# Patient Record
Sex: MALE | Race: WHITE | NOT HISPANIC OR LATINO | Employment: FULL TIME | URBAN - NONMETROPOLITAN AREA
[De-identification: names, ages, dates, MRNs, and addresses within clinical notes are randomized per-mention and may not be internally consistent; named-entity substitution may affect disease eponyms.]

---

## 2017-06-21 ENCOUNTER — TELEPHONE (OUTPATIENT)
Dept: SURGERY | Facility: CLINIC | Age: 52
End: 2017-06-21

## 2017-06-28 ENCOUNTER — TELEPHONE (OUTPATIENT)
Dept: SURGERY | Facility: CLINIC | Age: 52
End: 2017-06-28

## 2017-07-07 ENCOUNTER — TELEPHONE (OUTPATIENT)
Dept: SURGERY | Facility: CLINIC | Age: 52
End: 2017-07-07

## 2017-07-07 NOTE — TELEPHONE ENCOUNTER
I called pt again to attempt to schedule a screening colonoscopy, I left a message on his voice mail asking him to return my call, I also called Funmilayo Lima's office to inform her that I have not been able to contact pt, I will also mail him letter asking him to call the office to schedule.

## 2018-09-07 ENCOUNTER — TELEPHONE (OUTPATIENT)
Dept: SURGERY | Facility: CLINIC | Age: 53
End: 2018-09-07

## 2018-09-13 NOTE — TELEPHONE ENCOUNTER
Pt said he had a colonoscopy 3-5 yrs ago at .  THey are unable to find any record of him.  Nothing at [facility] either.

## 2018-09-14 ENCOUNTER — OFFICE VISIT (OUTPATIENT)
Dept: SURGERY | Facility: CLINIC | Age: 53
End: 2018-09-14

## 2018-09-14 VITALS
HEIGHT: 66 IN | DIASTOLIC BLOOD PRESSURE: 84 MMHG | SYSTOLIC BLOOD PRESSURE: 132 MMHG | WEIGHT: 218.8 LBS | TEMPERATURE: 96.9 F | BODY MASS INDEX: 35.17 KG/M2 | HEART RATE: 69 BPM | OXYGEN SATURATION: 98 %

## 2018-09-14 DIAGNOSIS — Z12.11 SCREENING FOR COLON CANCER: Primary | ICD-10-CM

## 2018-09-14 PROCEDURE — S0260 H&P FOR SURGERY: HCPCS | Performed by: SURGERY

## 2018-09-14 RX ORDER — BENAZEPRIL HYDROCHLORIDE 40 MG/1
40 TABLET, FILM COATED ORAL DAILY
Refills: 3 | COMMUNITY
Start: 2018-09-10

## 2018-09-14 RX ORDER — POLYETHYLENE GLYCOL 3350 17 G/17G
238 POWDER, FOR SOLUTION ORAL ONCE
Qty: 14 PACKET | Refills: 0 | Status: SHIPPED | OUTPATIENT
Start: 2018-09-14 | End: 2018-09-14

## 2018-09-14 RX ORDER — BISACODYL 5 MG/1
5 TABLET, DELAYED RELEASE ORAL DAILY
Qty: 4 TABLET | Refills: 0 | Status: SHIPPED | OUTPATIENT
Start: 2018-09-14

## 2018-09-14 NOTE — PROGRESS NOTES
Patient: Michael Paz    YOB: 1965    Date: 09/14/2018    Primary Care Provider: Jenniffer Lima APRN    Chief Complaint   Patient presents with   • Diarrhea       Subjective .     History of present illness:  Patient here for screen colonoscopy.  Had a colonoscopy 12 years ago.  No complaints except for occasional diarrhea.    The following portions of the patient's history were reviewed and updated as appropriate: allergies, current medications, past family history, past medical history, past social history, past surgical history and problem list.      Review of Systems   Constitutional: Negative for chills, fever and unexpected weight change.   HENT: Negative for trouble swallowing and voice change.    Eyes: Negative for visual disturbance.   Respiratory: Positive for chest tightness. Negative for apnea, cough, shortness of breath and wheezing.    Cardiovascular: Negative for chest pain, palpitations and leg swelling.   Gastrointestinal: Positive for diarrhea. Negative for abdominal distention, abdominal pain, anal bleeding, blood in stool, constipation, nausea, rectal pain and vomiting.   Endocrine: Negative for cold intolerance and heat intolerance.   Genitourinary: Negative for difficulty urinating, dysuria, flank pain, scrotal swelling and testicular pain.   Musculoskeletal: Negative for back pain, gait problem and joint swelling.   Skin: Negative for color change, rash and wound.   Neurological: Negative for dizziness, syncope, speech difficulty, weakness, numbness and headaches.   Hematological: Negative for adenopathy. Does not bruise/bleed easily.   Psychiatric/Behavioral: Negative for confusion. The patient is not nervous/anxious.        History:  Past Medical History:   Diagnosis Date   • Acid reflux    • Asthma    • Depression    • Diabetes mellitus (CMS/HCC)    • Heart murmur    • Hypertension    • Polyp of colon 2007       Past Surgical History:   Procedure Laterality Date   •  "COLONOSCOPY  2007    Dr Jef Hays [facility]       Family History   Problem Relation Age of Onset   • Heart disease Father    • Hyperlipidemia Father    • Hypertension Father    • Kidney disease Brother    • Stroke Maternal Grandmother        Social History   Substance Use Topics   • Smoking status: Never Smoker   • Smokeless tobacco: Never Used   • Alcohol use No       Medications:   Current Outpatient Prescriptions:   •  aspirin 81 MG EC tablet, Take 81 mg by mouth daily., Disp: , Rfl:   •  atorvastatin (LIPITOR) 40 MG tablet, , Disp: , Rfl:   •  benazepril (LOTENSIN) 40 MG tablet, Take 40 mg by mouth Daily. 200001, Disp: , Rfl: 3  •  fexofenadine (ALLEGRA) 180 MG tablet, Take 180 mg by mouth daily., Disp: , Rfl:   •  hydrochlorothiazide (HYDRODIURIL) 25 MG tablet, , Disp: , Rfl:   •  metFORMIN (GLUCOPHAGE) 500 MG tablet, , Disp: , Rfl:   •  omeprazole (PriLOSEC) 40 MG capsule, , Disp: , Rfl:   •  sertraline (ZOLOFT) 100 MG tablet, , Disp: , Rfl:   •  VENTOLIN  (90 BASE) MCG/ACT inhaler, , Disp: , Rfl:        Allergies:   Allergies   Allergen Reactions   • Penicillins        Objective     Vital Signs:  Vitals:    09/14/18 0853   BP: 132/84   Pulse: 69   Temp: 96.9 °F (36.1 °C)   SpO2: 98%   Weight: 99.2 kg (218 lb 12.8 oz)   Height: 167.6 cm (66\")       Physical Exam:   General Appearance:    Alert, cooperative, in no acute distress   Head:    Normocephalic, without obvious abnormality, atraumatic   Eyes:            Lids and lashes normal, conjunctivae and sclerae normal, no   icterus, no pallor, corneas clear, PERRL   Ears:    Ears appear intact with no abnormalities noted   Lungs:     Clear to auscultation,respirations regular, even and                  unlabored    Heart:    Regular rhythm and normal rate, normal S1 and S2, no            murmur   Abdomen:     no masses, no organomegaly, soft non-tender, non-distended, no guarding   Extremities:   Moves all extremities well, no " edema, no cyanosis, no             redness   Skin:   No bleeding, bruising or rash   Neurologic:   Cranial nerves 2 - 12 grossly intact, sensation intact  Psychiatric: No evidence of depression or anxiety   Results Review:   None    Review of Systems was reviewed and confirmed as accurate today.    Assessment/Plan :    1. Screening for colon cancer        I recommend a colonoscopy for further evaluation. The procedure was explained as well as the risks which include but are not limited to bleeding, infection, perforation, abdominal pain etc. The patient understands these risks and the procedure and wishes to proceed.      Electronically signed by Pepe Alfaro MD  09/14/18  8:56 AM    Portions of this note have been scribed for Pepe Alfaro MD by Margarita Grant CMA 9/14/2018  9:20 AM

## 2018-09-17 ENCOUNTER — PREP FOR SURGERY (OUTPATIENT)
Dept: OTHER | Facility: HOSPITAL | Age: 53
End: 2018-09-17

## 2018-09-17 DIAGNOSIS — Z12.11 COLON CANCER SCREENING: Primary | ICD-10-CM

## 2018-09-18 PROBLEM — Z12.11 COLON CANCER SCREENING: Status: ACTIVE | Noted: 2018-09-18

## 2018-10-17 ENCOUNTER — TELEPHONE (OUTPATIENT)
Dept: SURGERY | Facility: CLINIC | Age: 53
End: 2018-10-17

## 2019-01-21 ENCOUNTER — TELEPHONE (OUTPATIENT)
Dept: SURGERY | Facility: CLINIC | Age: 54
End: 2019-01-21

## 2019-02-26 ENCOUNTER — TELEPHONE (OUTPATIENT)
Dept: SURGERY | Facility: CLINIC | Age: 54
End: 2019-02-26

## 2019-03-18 ENCOUNTER — TELEPHONE (OUTPATIENT)
Dept: SURGERY | Facility: CLINIC | Age: 54
End: 2019-03-18

## 2019-03-19 ENCOUNTER — ANESTHESIA EVENT (OUTPATIENT)
Dept: GASTROENTEROLOGY | Facility: HOSPITAL | Age: 54
End: 2019-03-19

## 2019-03-19 ENCOUNTER — PREP FOR SURGERY (OUTPATIENT)
Dept: OTHER | Facility: HOSPITAL | Age: 54
End: 2019-03-19

## 2019-03-19 ENCOUNTER — HOSPITAL ENCOUNTER (OUTPATIENT)
Facility: HOSPITAL | Age: 54
Setting detail: HOSPITAL OUTPATIENT SURGERY
Discharge: HOME | End: 2019-03-19
Attending: SURGERY | Admitting: SURGERY

## 2019-03-19 ENCOUNTER — ANESTHESIA (OUTPATIENT)
Dept: GASTROENTEROLOGY | Facility: HOSPITAL | Age: 54
End: 2019-03-19

## 2019-03-19 VITALS
DIASTOLIC BLOOD PRESSURE: 72 MMHG | RESPIRATION RATE: 18 BRPM | HEART RATE: 58 BPM | SYSTOLIC BLOOD PRESSURE: 119 MMHG | WEIGHT: 198 LBS | BODY MASS INDEX: 31.82 KG/M2 | OXYGEN SATURATION: 99 % | HEIGHT: 66 IN | TEMPERATURE: 97.3 F

## 2019-03-19 PROCEDURE — S0260 H&P FOR SURGERY: HCPCS | Performed by: SURGERY

## 2019-03-19 PROCEDURE — 25010000002 PROPOFOL 10 MG/ML EMULSION: Performed by: NURSE ANESTHETIST, CERTIFIED REGISTERED

## 2019-03-19 PROCEDURE — 25010000002 MIDAZOLAM PER 1 MG: Performed by: NURSE ANESTHETIST, CERTIFIED REGISTERED

## 2019-03-19 RX ORDER — MAGNESIUM HYDROXIDE 1200 MG/15ML
LIQUID ORAL AS NEEDED
Status: DISCONTINUED | OUTPATIENT
Start: 2019-03-19 | End: 2019-03-19 | Stop reason: HOSPADM

## 2019-03-19 RX ORDER — MEPERIDINE HYDROCHLORIDE 50 MG/ML
INJECTION INTRAMUSCULAR; INTRAVENOUS; SUBCUTANEOUS AS NEEDED
Status: DISCONTINUED | OUTPATIENT
Start: 2019-03-19 | End: 2019-03-19 | Stop reason: SURG

## 2019-03-19 RX ORDER — SODIUM CHLORIDE, SODIUM LACTATE, POTASSIUM CHLORIDE, CALCIUM CHLORIDE 600; 310; 30; 20 MG/100ML; MG/100ML; MG/100ML; MG/100ML
INJECTION, SOLUTION INTRAVENOUS CONTINUOUS PRN
Status: DISCONTINUED | OUTPATIENT
Start: 2019-03-19 | End: 2019-03-19 | Stop reason: SURG

## 2019-03-19 RX ORDER — SODIUM CHLORIDE, SODIUM LACTATE, POTASSIUM CHLORIDE, CALCIUM CHLORIDE 600; 310; 30; 20 MG/100ML; MG/100ML; MG/100ML; MG/100ML
9 INJECTION, SOLUTION INTRAVENOUS CONTINUOUS PRN
Status: DISCONTINUED | OUTPATIENT
Start: 2019-03-19 | End: 2019-03-19 | Stop reason: HOSPADM

## 2019-03-19 RX ORDER — ONDANSETRON 2 MG/ML
4 INJECTION INTRAMUSCULAR; INTRAVENOUS ONCE AS NEEDED
Status: DISCONTINUED | OUTPATIENT
Start: 2019-03-19 | End: 2019-03-19 | Stop reason: HOSPADM

## 2019-03-19 RX ORDER — PROPOFOL 10 MG/ML
VIAL (ML) INTRAVENOUS AS NEEDED
Status: DISCONTINUED | OUTPATIENT
Start: 2019-03-19 | End: 2019-03-19 | Stop reason: SURG

## 2019-03-19 RX ORDER — SODIUM CHLORIDE 0.9 % (FLUSH) 0.9 %
3-10 SYRINGE (ML) INJECTION AS NEEDED
Status: DISCONTINUED | OUTPATIENT
Start: 2019-03-19 | End: 2019-03-19 | Stop reason: HOSPADM

## 2019-03-19 RX ORDER — KETAMINE HCL IN NACL, ISO-OSM 100MG/10ML
SYRINGE (ML) INJECTION AS NEEDED
Status: DISCONTINUED | OUTPATIENT
Start: 2019-03-19 | End: 2019-03-19 | Stop reason: SURG

## 2019-03-19 RX ORDER — SODIUM CHLORIDE 0.9 % (FLUSH) 0.9 %
3 SYRINGE (ML) INJECTION EVERY 12 HOURS SCHEDULED
Status: DISCONTINUED | OUTPATIENT
Start: 2019-03-19 | End: 2019-03-19 | Stop reason: HOSPADM

## 2019-03-19 RX ORDER — MIDAZOLAM HYDROCHLORIDE 1 MG/ML
INJECTION INTRAMUSCULAR; INTRAVENOUS AS NEEDED
Status: DISCONTINUED | OUTPATIENT
Start: 2019-03-19 | End: 2019-03-19 | Stop reason: SURG

## 2019-03-19 RX ADMIN — MEPERIDINE HYDROCHLORIDE 25 MG: 50 INJECTION, SOLUTION INTRAMUSCULAR; INTRAVENOUS; SUBCUTANEOUS at 12:31

## 2019-03-19 RX ADMIN — MIDAZOLAM HYDROCHLORIDE 2 MG: 1 INJECTION, SOLUTION INTRAMUSCULAR; INTRAVENOUS at 12:30

## 2019-03-19 RX ADMIN — PROPOFOL 50 MG: 10 INJECTION, EMULSION INTRAVENOUS at 12:31

## 2019-03-19 RX ADMIN — SODIUM CHLORIDE, POTASSIUM CHLORIDE, SODIUM LACTATE AND CALCIUM CHLORIDE 9 ML/HR: 600; 310; 30; 20 INJECTION, SOLUTION INTRAVENOUS at 11:29

## 2019-03-19 RX ADMIN — PROPOFOL 20 MG: 10 INJECTION, EMULSION INTRAVENOUS at 12:37

## 2019-03-19 RX ADMIN — MEPERIDINE HYDROCHLORIDE 25 MG: 50 INJECTION, SOLUTION INTRAMUSCULAR; INTRAVENOUS; SUBCUTANEOUS at 12:34

## 2019-03-19 RX ADMIN — PROPOFOL 20 MG: 10 INJECTION, EMULSION INTRAVENOUS at 12:34

## 2019-03-19 RX ADMIN — PROPOFOL 20 MG: 10 INJECTION, EMULSION INTRAVENOUS at 12:45

## 2019-03-19 RX ADMIN — Medication 25 MG: at 12:33

## 2019-03-19 RX ADMIN — PROPOFOL 20 MG: 10 INJECTION, EMULSION INTRAVENOUS at 12:40

## 2019-03-19 RX ADMIN — SODIUM CHLORIDE, POTASSIUM CHLORIDE, SODIUM LACTATE AND CALCIUM CHLORIDE: 600; 310; 30; 20 INJECTION, SOLUTION INTRAVENOUS at 12:05

## 2019-03-19 RX ADMIN — PROPOFOL 20 MG: 10 INJECTION, EMULSION INTRAVENOUS at 12:43

## 2019-03-19 NOTE — H&P
[facility]   HISTORY AND PHYSICAL      Name:  Michael Paz   Age:  53 y.o.  Sex:  male  :  1965  MRN:  [id number]   Visit Number:  [id number]  Admission Date:  3/19/2019  Date Of Service:  19  Primary Care Physician:  Jenniffer Lima APRN    Chief Complaint:     I need a colonoscopy    History Of Presenting Illness:      Patient is here for his planned colonoscopy.  He has no complaints.    Review Of Systems:     General ROS: Patient denies any fevers, chills or loss of consciousness.  No complaints of generalized weakness  Psychological ROS: Denies any hallucinations and delusions.  Respiratory ROS: Denies cough or shortness of breath.   Cardiovascular ROS: Denies chest pain or palpitations. No history of exertional chest pain.   Gastrointestinal ROS: Denies nausea and vomiting. Denies any abdominal pain. No diarrhea.   Genito-Urinary ROS: Denies dysuria or hematuria.  Neurological ROS: Denies any focal weakness. No loss of consciousness. Denies any numbness.   Dermatological ROS: Denies any redness or pruritis.     Past Medical History:    Past Medical History:   Diagnosis Date   • Acid reflux    • Asthma    • Depression    • Diabetes mellitus (CMS/[facility])    • Heart murmur    • Hyperlipidemia    • Hypertension    • Polyp of colon    • Wears glasses        Past Surgical history:    Past Surgical History:   Procedure Laterality Date   • COLONOSCOPY      Dr Jef Hays [facility]   • MULTIPLE TOOTH EXTRACTIONS         Social History:    Social History     Socioeconomic History   • Marital status:      Spouse name: Not on file   • Number of children: Not on file   • Years of education: Not on file   • Highest education level: Not on file   Social Needs   • Financial resource strain: Not on file   • Food insecurity - worry: Not on file   • Food insecurity - inability: Not on file   • Transportation needs - medical: Not on file   •  Transportation needs - non-medical: Not on file   Occupational History   • Not on file   Tobacco Use   • Smoking status: Never Smoker   • Smokeless tobacco: Former User   Substance and Sexual Activity   • Alcohol use: No   • Drug use: No   • Sexual activity: Defer   Other Topics Concern   • Not on file   Social History Narrative   • Not on file       Family History:    Family History   Problem Relation Age of Onset   • Heart disease Father    • Hyperlipidemia Father    • Hypertension Father    • Kidney disease Brother    • Stroke Maternal Grandmother        Allergies:      Penicillins    Home Medications:    Prior to Admission Medications     Prescriptions Last Dose Informant Patient Reported? Taking?    aspirin 81 MG EC tablet Past Week Self Yes Yes    Take 81 mg by mouth daily.    atorvastatin (LIPITOR) 40 MG tablet 3/18/2019 Self Yes Yes    Take 40 mg by mouth Daily.    benazepril (LOTENSIN) 40 MG tablet 3/18/2019 Self Yes Yes    Take 40 mg by mouth Daily. 200001    bisacodyl (bisacodyl) 5 MG EC tablet 3/18/2019  No Yes    Take 1 tablet by mouth Daily.    fexofenadine (ALLEGRA) 180 MG tablet 3/18/2019 Self Yes Yes    Take 180 mg by mouth daily.    hydrochlorothiazide (HYDRODIURIL) 25 MG tablet 3/18/2019 Self Yes Yes    Take 25 mg by mouth Daily.    metFORMIN (GLUCOPHAGE) 500 MG tablet 3/18/2019 Self Yes Yes    Take 500 mg by mouth Daily With Breakfast.    omeprazole (PriLOSEC) 40 MG capsule 3/18/2019 Self Yes Yes    Take 40 mg by mouth Daily.    sertraline (ZOLOFT) 100 MG tablet 3/18/2019 Self Yes Yes    Take 150 mg by mouth Daily.    VENTOLIN  (90 BASE) MCG/ACT inhaler Past Week Self Yes Yes    Inhale 2 puffs Every 4 (Four) Hours As Needed for Wheezing.             ED Medications:    Medications   sodium chloride 0.9 % flush 3 mL (not administered)   sodium chloride 0.9 % flush 3-10 mL (not administered)   lactated ringers infusion (9 mL/hr Intravenous New Bag 3/19/19 1123)   polyethylene glycol 3350  powder (bulk) (not administered)   sterile water irrigation solution (500 mL Irrigation Given 3/19/19 1228)       Vital Signs:    Temp:  [98 °F (36.7 °C)] 98 °F (36.7 °C)  Heart Rate:  [71] 71  BP: (135)/(93) 135/93        03/18/19  1129   Weight: 89.8 kg (198 lb)       Body mass index is 31.96 kg/m².    Physical Exam:      General Appearance:  Alert and cooperative, not in any acute distress.   Head:  Atraumatic and normocephalic, without obvious abnormality.   Eyes:          PERRLA, conjunctivae and sclerae normal, no Icterus. No pallor. Extra-occular movements are within normal limits.   Ears:  Ears appear intact with no abnormalities noted.   Respiratory/Lungs:   Breath sounds heard bilaterally equally.  No crackles or wheezing. No Pleural rub or bronchial breathing. Normal respiratory effort.    Cardiovascular/Heart:  Normal S1 and S2,    GI/Abdomen:   No masses, no hepatosplenomegaly. Soft, non-tender, non-distended, no hernia                 Musculoskeletal/ Extremities:   Moves all extremities well   Skin: No bleeding, bruising or rash, no induration   Psychiatric : Alert and oriented ×3.  No depression or anxiety    Neurologic: Cranial nerves 2 - 12 grossly intact, sensation intact, Motor power is normal and equal bilaterally.       EKG:          Labs:    Lab Results (last 24 hours)     ** No results found for the last 24 hours. **          Radiology:    Imaging Results (last 72 hours)     ** No results found for the last 72 hours. **          Assessment:    Screening colonoscopy     Plan:     I recommend a screening colonoscopy to the patient.  The patient understands the procedure and the reason for the procedure.  The patient also understands the risks which include but are not limited to bleeding and perforation and they understand the ramifications of these potential complications and wish to proceed.    Pepe Alfaro MD  03/19/19  12:30 PM

## 2019-03-19 NOTE — DISCHARGE INSTRUCTIONS
Please follow all post op instructions and follow up appointment time from your physician's office included in your discharge packet.    No pushing, pulling, tugging,  heavy lifting, or strenuous activity.  No major decision making, driving, or drinking alcoholic beverages for 24 hours. ( due to the medications you have  received)  Always use good hand hygiene/washing techniques.  NO driving while taking pain medications.    To assist you in voiding:  Drink plenty of fluids  Listen to running water while attempting to void.    If you are unable to urinate and you have an uncomfortable urge to void or it has been   6 hours since you were discharged, return to the Emergency Room

## 2019-03-19 NOTE — ANESTHESIA POSTPROCEDURE EVALUATION
Patient: Michael Paz    Procedure Summary     Date:  03/19/19 Room / Location:  [facility] ENDOSCOPY 3 / [facility] ENDOSCOPY    Anesthesia Start:  1229 Anesthesia Stop:      Procedure:  COLONOSCOPY (N/A Anus) Diagnosis:      Surgeon:  Pepe Alfaro MD Provider:  Ángel Hurley CRNA    Anesthesia Type:  MAC ASA Status:  3          Anesthesia Type: MAC  Last vitals  BP   135/93 (03/19/19 1115)   Temp   98 °F (36.7 °C) (03/19/19 1115)   Pulse   71 (03/19/19 1115)   Resp         SpO2   97 % (03/19/19 1115)     Post Anesthesia Care and Evaluation    Patient location during evaluation: PHASE II  Patient participation: complete - patient participated  Level of consciousness: awake  Pain score: 0  Pain management: adequate  Airway patency: patent  Anesthetic complications: No anesthetic complications  PONV Status: none  Cardiovascular status: acceptable  Respiratory status: acceptable and nasal cannula  Hydration status: acceptable    Comments: vsss resp spont, reflexes intact, responsive, report given to pacu nurse

## 2019-03-19 NOTE — ANESTHESIA PREPROCEDURE EVALUATION
Anesthesia Evaluation     Patient summary reviewed and Nursing notes reviewed   no history of anesthetic complications:  NPO Solid Status: > 8 hours  NPO Liquid Status: > 8 hours           Airway   Mallampati: II  TM distance: >3 FB  Neck ROM: full  No difficulty expected and Possible difficult intubation  Dental      Pulmonary    (+) a smoker Current, COPD, asthma, shortness of breath, decreased breath sounds,   Cardiovascular     (+) hypertension, valvular problems/murmurs, AGUILERA, PVD, hyperlipidemia,   CAD:  inc risk obese, aiden, dm.      Neuro/Psych  (+) psychiatric history Depression and Anxiety,     GI/Hepatic/Renal/Endo    (+) obesity,  GERD,  diabetes mellitus,     Musculoskeletal     (+) arthralgias, myalgias,   Abdominal   (+) obese,    Substance History   (+) drug use     OB/GYN          Other                      Anesthesia Plan    ASA 3     MAC   (Risks and benefits discussed including risk of aspiration, recall and dental damage. All patient questions answered. Will continue with POC.)  intravenous induction   Anesthetic plan, all risks, benefits, and alternatives have been provided, discussed and informed consent has been obtained with: patient.

## 2019-06-21 ENCOUNTER — HOSPITAL ENCOUNTER (EMERGENCY)
Facility: HOSPITAL | Age: 54
Discharge: HOME | End: 2019-06-21
Attending: EMERGENCY MEDICINE | Admitting: EMERGENCY MEDICINE

## 2019-06-21 VITALS
HEIGHT: 66 IN | HEART RATE: 67 BPM | OXYGEN SATURATION: 99 % | WEIGHT: 200 LBS | TEMPERATURE: 97.8 F | RESPIRATION RATE: 16 BRPM | SYSTOLIC BLOOD PRESSURE: 119 MMHG | DIASTOLIC BLOOD PRESSURE: 71 MMHG | BODY MASS INDEX: 32.14 KG/M2

## 2019-06-21 DIAGNOSIS — E87.6 HYPOKALEMIA: Primary | ICD-10-CM

## 2019-06-21 LAB
ANION GAP SERPL CALCULATED.3IONS-SCNC: 13.7 MMOL/L (ref 10–20)
BUN BLD-MCNC: 17 MG/DL (ref 7–20)
BUN/CREAT SERPL: 17 (ref 6.3–21.9)
CALCIUM SPEC-SCNC: 9.1 MG/DL (ref 8.4–10.2)
CHLORIDE SERPL-SCNC: 101 MMOL/L (ref 98–107)
CO2 SERPL-SCNC: 30 MMOL/L (ref 26–30)
CREAT BLD-MCNC: 1 MG/DL (ref 0.6–1.3)
GFR SERPL CREATININE-BSD FRML MDRD: 78 ML/MIN/1.73
GLUCOSE BLD-MCNC: 134 MG/DL (ref 74–98)
POTASSIUM BLD-SCNC: 2.7 MMOL/L (ref 3.5–5.1)
SODIUM BLD-SCNC: 142 MMOL/L (ref 137–145)

## 2019-06-21 PROCEDURE — 80048 BASIC METABOLIC PNL TOTAL CA: CPT | Performed by: PHYSICIAN ASSISTANT

## 2019-06-21 PROCEDURE — 99283 EMERGENCY DEPT VISIT LOW MDM: CPT

## 2019-06-21 PROCEDURE — 25010000003 POTASSIUM CHLORIDE 10 MEQ/100ML SOLUTION: Performed by: PHYSICIAN ASSISTANT

## 2019-06-21 PROCEDURE — 96365 THER/PROPH/DIAG IV INF INIT: CPT

## 2019-06-21 RX ORDER — POTASSIUM CHLORIDE 750 MG/1
40 CAPSULE, EXTENDED RELEASE ORAL ONCE
Status: COMPLETED | OUTPATIENT
Start: 2019-06-21 | End: 2019-06-21

## 2019-06-21 RX ORDER — POTASSIUM CHLORIDE 750 MG/1
20 TABLET, FILM COATED, EXTENDED RELEASE ORAL DAILY
Qty: 5 TABLET | Refills: 0 | Status: SHIPPED | OUTPATIENT
Start: 2019-06-21

## 2019-06-21 RX ORDER — ACETAMINOPHEN 500 MG
1000 TABLET ORAL ONCE
Status: COMPLETED | OUTPATIENT
Start: 2019-06-21 | End: 2019-06-21

## 2019-06-21 RX ORDER — POTASSIUM CHLORIDE 7.45 MG/ML
10 INJECTION INTRAVENOUS ONCE
Status: COMPLETED | OUTPATIENT
Start: 2019-06-21 | End: 2019-06-21

## 2019-06-21 RX ADMIN — POTASSIUM CHLORIDE 10 MEQ: 7.46 INJECTION, SOLUTION INTRAVENOUS at 13:15

## 2019-06-21 RX ADMIN — ACETAMINOPHEN 1000 MG: 500 TABLET, FILM COATED ORAL at 14:36

## 2019-06-21 RX ADMIN — POTASSIUM CHLORIDE 40 MEQ: 750 CAPSULE, EXTENDED RELEASE ORAL at 13:15

## 2019-06-25 ENCOUNTER — TRANSCRIBE ORDERS (OUTPATIENT)
Dept: LAB | Facility: HOSPITAL | Age: 54
End: 2019-06-25

## 2019-06-25 ENCOUNTER — LAB (OUTPATIENT)
Dept: LAB | Facility: HOSPITAL | Age: 54
End: 2019-06-25

## 2019-06-25 DIAGNOSIS — E87.6 HYPOPOTASSEMIA: ICD-10-CM

## 2019-06-25 DIAGNOSIS — E87.6 HYPOPOTASSEMIA: Primary | ICD-10-CM

## 2019-06-25 LAB
ALBUMIN SERPL-MCNC: 4.6 G/DL (ref 3.5–5)
ALBUMIN/GLOB SERPL: 1.4 G/DL (ref 1–2)
ALP SERPL-CCNC: 62 U/L (ref 38–126)
ALT SERPL W P-5'-P-CCNC: 41 U/L (ref 13–69)
ANION GAP SERPL CALCULATED.3IONS-SCNC: 13.4 MMOL/L (ref 10–20)
AST SERPL-CCNC: 38 U/L (ref 15–46)
BILIRUB SERPL-MCNC: 0.8 MG/DL (ref 0.2–1.3)
BUN BLD-MCNC: 17 MG/DL (ref 7–20)
BUN/CREAT SERPL: 17 (ref 6.3–21.9)
CALCIUM SPEC-SCNC: 9.2 MG/DL (ref 8.4–10.2)
CHLORIDE SERPL-SCNC: 99 MMOL/L (ref 98–107)
CO2 SERPL-SCNC: 31 MMOL/L (ref 26–30)
CREAT BLD-MCNC: 1 MG/DL (ref 0.6–1.3)
GFR SERPL CREATININE-BSD FRML MDRD: 78 ML/MIN/1.73
GLOBULIN UR ELPH-MCNC: 3.2 GM/DL
GLUCOSE BLD-MCNC: 101 MG/DL (ref 74–98)
POTASSIUM BLD-SCNC: 3.4 MMOL/L (ref 3.5–5.1)
PROT SERPL-MCNC: 7.8 G/DL (ref 6.3–8.2)
SODIUM BLD-SCNC: 140 MMOL/L (ref 137–145)

## 2019-06-25 PROCEDURE — 80053 COMPREHEN METABOLIC PANEL: CPT

## 2019-06-25 PROCEDURE — 36415 COLL VENOUS BLD VENIPUNCTURE: CPT

## 2019-07-02 ENCOUNTER — TRANSCRIBE ORDERS (OUTPATIENT)
Dept: LAB | Facility: HOSPITAL | Age: 54
End: 2019-07-02

## 2019-07-02 ENCOUNTER — LAB (OUTPATIENT)
Dept: LAB | Facility: HOSPITAL | Age: 54
End: 2019-07-02

## 2019-07-02 DIAGNOSIS — E87.6 HYPOPOTASSEMIA: ICD-10-CM

## 2019-07-02 DIAGNOSIS — E87.6 HYPOPOTASSEMIA: Primary | ICD-10-CM

## 2019-07-02 LAB
ALBUMIN SERPL-MCNC: 4.4 G/DL (ref 3.5–5)
ALBUMIN/GLOB SERPL: 1.4 G/DL (ref 1–2)
ALP SERPL-CCNC: 73 U/L (ref 38–126)
ALT SERPL W P-5'-P-CCNC: 32 U/L (ref 13–69)
ANION GAP SERPL CALCULATED.3IONS-SCNC: 10.6 MMOL/L (ref 10–20)
AST SERPL-CCNC: 31 U/L (ref 15–46)
BILIRUB SERPL-MCNC: 0.6 MG/DL (ref 0.2–1.3)
BUN BLD-MCNC: 19 MG/DL (ref 7–20)
BUN/CREAT SERPL: 21.1 (ref 6.3–21.9)
CALCIUM SPEC-SCNC: 8.8 MG/DL (ref 8.4–10.2)
CHLORIDE SERPL-SCNC: 100 MMOL/L (ref 98–107)
CO2 SERPL-SCNC: 31 MMOL/L (ref 26–30)
CREAT BLD-MCNC: 0.9 MG/DL (ref 0.6–1.3)
GFR SERPL CREATININE-BSD FRML MDRD: 88 ML/MIN/1.73
GLOBULIN UR ELPH-MCNC: 3.2 GM/DL
GLUCOSE BLD-MCNC: 104 MG/DL (ref 74–98)
POTASSIUM BLD-SCNC: 3.6 MMOL/L (ref 3.5–5.1)
PROT SERPL-MCNC: 7.6 G/DL (ref 6.3–8.2)
SODIUM BLD-SCNC: 138 MMOL/L (ref 137–145)

## 2019-07-02 PROCEDURE — 36415 COLL VENOUS BLD VENIPUNCTURE: CPT

## 2019-07-02 PROCEDURE — 80053 COMPREHEN METABOLIC PANEL: CPT

## 2019-08-08 ENCOUNTER — HOSPITAL ENCOUNTER (OUTPATIENT)
Dept: GENERAL RADIOLOGY | Facility: HOSPITAL | Age: 54
Discharge: HOME | End: 2019-08-08
Admitting: NURSE PRACTITIONER

## 2019-08-08 ENCOUNTER — TRANSCRIBE ORDERS (OUTPATIENT)
Dept: ADMINISTRATIVE | Facility: HOSPITAL | Age: 54
End: 2019-08-08

## 2019-08-08 DIAGNOSIS — M54.2 NECK PAIN: ICD-10-CM

## 2019-08-08 DIAGNOSIS — M54.5 LOW BACK PAIN, UNSPECIFIED BACK PAIN LATERALITY, UNSPECIFIED CHRONICITY, WITH SCIATICA PRESENCE UNSPECIFIED: Primary | ICD-10-CM

## 2019-08-08 PROCEDURE — 72050 X-RAY EXAM NECK SPINE 4/5VWS: CPT

## 2019-08-08 PROCEDURE — 72110 X-RAY EXAM L-2 SPINE 4/>VWS: CPT

## 2020-01-10 ENCOUNTER — APPOINTMENT (OUTPATIENT)
Dept: GENERAL RADIOLOGY | Facility: HOSPITAL | Age: 55
End: 2020-01-10

## 2020-01-10 ENCOUNTER — TRANSCRIBE ORDERS (OUTPATIENT)
Dept: GENERAL RADIOLOGY | Facility: HOSPITAL | Age: 55
End: 2020-01-10

## 2020-01-10 ENCOUNTER — HOSPITAL ENCOUNTER (OUTPATIENT)
Dept: GENERAL RADIOLOGY | Facility: HOSPITAL | Age: 55
Discharge: HOME | End: 2020-01-10
Admitting: NURSE PRACTITIONER

## 2020-01-10 DIAGNOSIS — R09.89 CHEST CONGESTION: ICD-10-CM

## 2020-01-10 DIAGNOSIS — R05.9 COUGH: ICD-10-CM

## 2020-01-10 DIAGNOSIS — R05.9 COUGH: Primary | ICD-10-CM

## 2020-01-10 PROCEDURE — 71046 X-RAY EXAM CHEST 2 VIEWS: CPT

## 2025-07-21 ENCOUNTER — APPOINTMENT (OUTPATIENT)
Dept: CT IMAGING | Facility: HOSPITAL | Age: 60
End: 2025-07-21
Payer: MEDICARE

## 2025-07-21 ENCOUNTER — HOSPITAL ENCOUNTER (OUTPATIENT)
Facility: HOSPITAL | Age: 60
Setting detail: OBSERVATION
Discharge: HOME | End: 2025-07-23
Attending: EMERGENCY MEDICINE | Admitting: STUDENT IN AN ORGANIZED HEALTH CARE EDUCATION/TRAINING PROGRAM
Payer: MEDICARE

## 2025-07-21 DIAGNOSIS — K80.80 BILIARY CALCULUS OF OTHER SITE WITHOUT OBSTRUCTION: ICD-10-CM

## 2025-07-21 DIAGNOSIS — K80.00 CALCULUS OF GALLBLADDER WITH ACUTE CHOLECYSTITIS WITHOUT OBSTRUCTION: Primary | ICD-10-CM

## 2025-07-21 LAB
ALBUMIN SERPL-MCNC: 4.4 G/DL (ref 3.5–5.2)
ALBUMIN/GLOB SERPL: 1.6 G/DL
ALP SERPL-CCNC: 99 U/L (ref 39–117)
ALT SERPL W P-5'-P-CCNC: 106 U/L (ref 1–41)
ANION GAP SERPL CALCULATED.3IONS-SCNC: 14.5 MMOL/L (ref 5–15)
AST SERPL-CCNC: 177 U/L (ref 1–40)
BACTERIA UR QL AUTO: ABNORMAL /HPF
BASOPHILS # BLD AUTO: 0.05 10*3/MM3 (ref 0–0.2)
BASOPHILS NFR BLD AUTO: 0.5 % (ref 0–1.5)
BILIRUB SERPL-MCNC: 1.3 MG/DL (ref 0–1.2)
BILIRUB UR QL STRIP: NEGATIVE
BUN SERPL-MCNC: 15 MG/DL (ref 6–20)
BUN/CREAT SERPL: 11.9 (ref 7–25)
CALCIUM SPEC-SCNC: 9.2 MG/DL (ref 8.6–10.5)
CHLORIDE SERPL-SCNC: 108 MMOL/L (ref 98–107)
CLARITY UR: CLEAR
CO2 SERPL-SCNC: 18.5 MMOL/L (ref 22–29)
COLOR UR: ABNORMAL
CREAT SERPL-MCNC: 1.26 MG/DL (ref 0.76–1.27)
D-LACTATE SERPL-SCNC: 1.5 MMOL/L (ref 0.5–2)
DEPRECATED RDW RBC AUTO: 39.2 FL (ref 37–54)
EGFRCR SERPLBLD CKD-EPI 2021: 65.7 ML/MIN/1.73
EOSINOPHIL # BLD AUTO: 0.44 10*3/MM3 (ref 0–0.4)
EOSINOPHIL NFR BLD AUTO: 4.2 % (ref 0.3–6.2)
ERYTHROCYTE [DISTWIDTH] IN BLOOD BY AUTOMATED COUNT: 12.2 % (ref 12.3–15.4)
GLOBULIN UR ELPH-MCNC: 2.8 GM/DL
GLUCOSE SERPL-MCNC: 125 MG/DL (ref 65–99)
GLUCOSE UR STRIP-MCNC: NEGATIVE MG/DL
HCT VFR BLD AUTO: 38.2 % (ref 37.5–51)
HGB BLD-MCNC: 13.4 G/DL (ref 13–17.7)
HGB UR QL STRIP.AUTO: ABNORMAL
HOLD SPECIMEN: NORMAL
HOLD SPECIMEN: NORMAL
HYALINE CASTS UR QL AUTO: ABNORMAL /LPF
IMM GRANULOCYTES # BLD AUTO: 0.02 10*3/MM3 (ref 0–0.05)
IMM GRANULOCYTES NFR BLD AUTO: 0.2 % (ref 0–0.5)
KETONES UR QL STRIP: ABNORMAL
LEUKOCYTE ESTERASE UR QL STRIP.AUTO: NEGATIVE
LIPASE SERPL-CCNC: 19 U/L (ref 13–60)
LYMPHOCYTES # BLD AUTO: 1.36 10*3/MM3 (ref 0.7–3.1)
LYMPHOCYTES NFR BLD AUTO: 12.8 % (ref 19.6–45.3)
MCH RBC QN AUTO: 30.4 PG (ref 26.6–33)
MCHC RBC AUTO-ENTMCNC: 35.1 G/DL (ref 31.5–35.7)
MCV RBC AUTO: 86.6 FL (ref 79–97)
MONOCYTES # BLD AUTO: 0.58 10*3/MM3 (ref 0.1–0.9)
MONOCYTES NFR BLD AUTO: 5.5 % (ref 5–12)
NEUTROPHILS NFR BLD AUTO: 76.8 % (ref 42.7–76)
NEUTROPHILS NFR BLD AUTO: 8.15 10*3/MM3 (ref 1.7–7)
NITRITE UR QL STRIP: NEGATIVE
NRBC BLD AUTO-RTO: 0 /100 WBC (ref 0–0.2)
PH UR STRIP.AUTO: 5.5 [PH] (ref 5–8)
PLATELET # BLD AUTO: 247 10*3/MM3 (ref 140–450)
PMV BLD AUTO: 9.4 FL (ref 6–12)
POTASSIUM SERPL-SCNC: 4 MMOL/L (ref 3.5–5.2)
PROT SERPL-MCNC: 7.2 G/DL (ref 6–8.5)
PROT UR QL STRIP: ABNORMAL
RBC # BLD AUTO: 4.41 10*6/MM3 (ref 4.14–5.8)
RBC # UR STRIP: ABNORMAL /HPF
REF LAB TEST METHOD: ABNORMAL
SODIUM SERPL-SCNC: 141 MMOL/L (ref 136–145)
SP GR UR STRIP: 1.02 (ref 1–1.03)
SQUAMOUS #/AREA URNS HPF: ABNORMAL /HPF
UROBILINOGEN UR QL STRIP: ABNORMAL
WBC # UR STRIP: ABNORMAL /HPF
WBC NRBC COR # BLD AUTO: 10.6 10*3/MM3 (ref 3.4–10.8)
WHOLE BLOOD HOLD COAG: NORMAL
WHOLE BLOOD HOLD SPECIMEN: NORMAL

## 2025-07-21 PROCEDURE — 99285 EMERGENCY DEPT VISIT HI MDM: CPT | Performed by: EMERGENCY MEDICINE

## 2025-07-21 PROCEDURE — 80053 COMPREHEN METABOLIC PANEL: CPT | Performed by: EMERGENCY MEDICINE

## 2025-07-21 PROCEDURE — 82077 ASSAY SPEC XCP UR&BREATH IA: CPT | Performed by: FAMILY MEDICINE

## 2025-07-21 PROCEDURE — 83690 ASSAY OF LIPASE: CPT | Performed by: EMERGENCY MEDICINE

## 2025-07-21 PROCEDURE — 83605 ASSAY OF LACTIC ACID: CPT | Performed by: EMERGENCY MEDICINE

## 2025-07-21 PROCEDURE — 74177 CT ABD & PELVIS W/CONTRAST: CPT

## 2025-07-21 PROCEDURE — 85025 COMPLETE CBC W/AUTO DIFF WBC: CPT | Performed by: EMERGENCY MEDICINE

## 2025-07-21 PROCEDURE — 81001 URINALYSIS AUTO W/SCOPE: CPT | Performed by: EMERGENCY MEDICINE

## 2025-07-21 RX ORDER — SODIUM CHLORIDE 0.9 % (FLUSH) 0.9 %
10 SYRINGE (ML) INJECTION AS NEEDED
Status: DISCONTINUED | OUTPATIENT
Start: 2025-07-21 | End: 2025-07-23 | Stop reason: HOSPADM

## 2025-07-22 ENCOUNTER — ANESTHESIA (OUTPATIENT)
Dept: PERIOP | Facility: HOSPITAL | Age: 60
End: 2025-07-22
Payer: MEDICARE

## 2025-07-22 ENCOUNTER — ANESTHESIA EVENT (OUTPATIENT)
Dept: PERIOP | Facility: HOSPITAL | Age: 60
End: 2025-07-22
Payer: MEDICARE

## 2025-07-22 ENCOUNTER — APPOINTMENT (OUTPATIENT)
Dept: GENERAL RADIOLOGY | Facility: HOSPITAL | Age: 60
End: 2025-07-22
Payer: MEDICARE

## 2025-07-22 ENCOUNTER — APPOINTMENT (OUTPATIENT)
Dept: ULTRASOUND IMAGING | Facility: HOSPITAL | Age: 60
End: 2025-07-22
Payer: MEDICARE

## 2025-07-22 PROBLEM — K80.20 CHOLELITHIASIS: Status: ACTIVE | Noted: 2025-07-22

## 2025-07-22 LAB
ALBUMIN SERPL-MCNC: 4.3 G/DL (ref 3.5–5.2)
ALBUMIN/GLOB SERPL: 1.8 G/DL
ALP SERPL-CCNC: 109 U/L (ref 39–117)
ALT SERPL W P-5'-P-CCNC: 174 U/L (ref 1–41)
ANION GAP SERPL CALCULATED.3IONS-SCNC: 12.3 MMOL/L (ref 5–15)
AST SERPL-CCNC: 190 U/L (ref 1–40)
BASOPHILS # BLD AUTO: 0.04 10*3/MM3 (ref 0–0.2)
BASOPHILS NFR BLD AUTO: 0.4 % (ref 0–1.5)
BILIRUB SERPL-MCNC: 0.6 MG/DL (ref 0–1.2)
BUN SERPL-MCNC: 15 MG/DL (ref 6–20)
BUN/CREAT SERPL: 11.2 (ref 7–25)
CALCIUM SPEC-SCNC: 8.9 MG/DL (ref 8.6–10.5)
CHLORIDE SERPL-SCNC: 107 MMOL/L (ref 98–107)
CO2 SERPL-SCNC: 22.7 MMOL/L (ref 22–29)
CREAT SERPL-MCNC: 1.34 MG/DL (ref 0.76–1.27)
DEPRECATED RDW RBC AUTO: 39.6 FL (ref 37–54)
EGFRCR SERPLBLD CKD-EPI 2021: 61 ML/MIN/1.73
EOSINOPHIL # BLD AUTO: 0.19 10*3/MM3 (ref 0–0.4)
EOSINOPHIL NFR BLD AUTO: 1.7 % (ref 0.3–6.2)
ERYTHROCYTE [DISTWIDTH] IN BLOOD BY AUTOMATED COUNT: 12.3 % (ref 12.3–15.4)
ETHANOL BLD-MCNC: <10 MG/DL (ref 0–10)
ETHANOL UR QL: <0.01 %
GLOBULIN UR ELPH-MCNC: 2.4 GM/DL
GLUCOSE SERPL-MCNC: 121 MG/DL (ref 65–99)
HCT VFR BLD AUTO: 36.6 % (ref 37.5–51)
HGB BLD-MCNC: 12.8 G/DL (ref 13–17.7)
IMM GRANULOCYTES # BLD AUTO: 0.06 10*3/MM3 (ref 0–0.05)
IMM GRANULOCYTES NFR BLD AUTO: 0.5 % (ref 0–0.5)
LYMPHOCYTES # BLD AUTO: 1.39 10*3/MM3 (ref 0.7–3.1)
LYMPHOCYTES NFR BLD AUTO: 12.4 % (ref 19.6–45.3)
MCH RBC QN AUTO: 30.5 PG (ref 26.6–33)
MCHC RBC AUTO-ENTMCNC: 35 G/DL (ref 31.5–35.7)
MCV RBC AUTO: 87.1 FL (ref 79–97)
MONOCYTES # BLD AUTO: 0.77 10*3/MM3 (ref 0.1–0.9)
MONOCYTES NFR BLD AUTO: 6.8 % (ref 5–12)
NEUTROPHILS NFR BLD AUTO: 78.2 % (ref 42.7–76)
NEUTROPHILS NFR BLD AUTO: 8.8 10*3/MM3 (ref 1.7–7)
NRBC BLD AUTO-RTO: 0 /100 WBC (ref 0–0.2)
PLATELET # BLD AUTO: 225 10*3/MM3 (ref 140–450)
PMV BLD AUTO: 9.7 FL (ref 6–12)
POTASSIUM SERPL-SCNC: 3.8 MMOL/L (ref 3.5–5.2)
PROT SERPL-MCNC: 6.7 G/DL (ref 6–8.5)
RBC # BLD AUTO: 4.2 10*6/MM3 (ref 4.14–5.8)
SODIUM SERPL-SCNC: 142 MMOL/L (ref 136–145)
WBC NRBC COR # BLD AUTO: 11.25 10*3/MM3 (ref 3.4–10.8)

## 2025-07-22 PROCEDURE — 25010000002 MIDAZOLAM PER 1MG: Performed by: NURSE ANESTHETIST, CERTIFIED REGISTERED

## 2025-07-22 PROCEDURE — 80053 COMPREHEN METABOLIC PANEL: CPT | Performed by: FAMILY MEDICINE

## 2025-07-22 PROCEDURE — 25010000002 ONDANSETRON PER 1 MG: Performed by: FAMILY MEDICINE

## 2025-07-22 PROCEDURE — 25010000002 DEXAMETHASONE PER 1 MG: Performed by: NURSE ANESTHETIST, CERTIFIED REGISTERED

## 2025-07-22 PROCEDURE — 25810000003 LACTATED RINGERS PER 1000 ML: Performed by: STUDENT IN AN ORGANIZED HEALTH CARE EDUCATION/TRAINING PROGRAM

## 2025-07-22 PROCEDURE — 25010000002 MORPHINE PER 10 MG: Performed by: FAMILY MEDICINE

## 2025-07-22 PROCEDURE — 25010000002 CEFTRIAXONE PER 250 MG: Performed by: STUDENT IN AN ORGANIZED HEALTH CARE EDUCATION/TRAINING PROGRAM

## 2025-07-22 PROCEDURE — 96375 TX/PRO/DX INJ NEW DRUG ADDON: CPT

## 2025-07-22 PROCEDURE — 25810000003 LACTATED RINGERS PER 1000 ML: Performed by: FAMILY MEDICINE

## 2025-07-22 PROCEDURE — 25010000002 LIDOCAINE (CARDIAC): Performed by: NURSE ANESTHETIST, CERTIFIED REGISTERED

## 2025-07-22 PROCEDURE — 96361 HYDRATE IV INFUSION ADD-ON: CPT

## 2025-07-22 PROCEDURE — 96374 THER/PROPH/DIAG INJ IV PUSH: CPT

## 2025-07-22 PROCEDURE — 25010000002 MORPHINE PER 10 MG: Performed by: STUDENT IN AN ORGANIZED HEALTH CARE EDUCATION/TRAINING PROGRAM

## 2025-07-22 PROCEDURE — 25810000003 LACTATED RINGERS PER 1000 ML: Performed by: NURSE ANESTHETIST, CERTIFIED REGISTERED

## 2025-07-22 PROCEDURE — G0378 HOSPITAL OBSERVATION PER HR: HCPCS

## 2025-07-22 PROCEDURE — 99222 1ST HOSP IP/OBS MODERATE 55: CPT | Performed by: FAMILY MEDICINE

## 2025-07-22 PROCEDURE — 25510000001 IOPAMIDOL 61 % SOLUTION: Performed by: EMERGENCY MEDICINE

## 2025-07-22 PROCEDURE — 25010000002 ONDANSETRON PER 1 MG: Performed by: NURSE ANESTHETIST, CERTIFIED REGISTERED

## 2025-07-22 PROCEDURE — 25010000002 HYDROMORPHONE PER 4 MG: Performed by: NURSE ANESTHETIST, CERTIFIED REGISTERED

## 2025-07-22 PROCEDURE — 25010000002 LIDOCAINE 1 % SOLUTION: Performed by: STUDENT IN AN ORGANIZED HEALTH CARE EDUCATION/TRAINING PROGRAM

## 2025-07-22 PROCEDURE — 25010000002 INDOCYANINE GREEN 25 MG RECONSTITUTED SOLUTION: Performed by: STUDENT IN AN ORGANIZED HEALTH CARE EDUCATION/TRAINING PROGRAM

## 2025-07-22 PROCEDURE — 88304 TISSUE EXAM BY PATHOLOGIST: CPT

## 2025-07-22 PROCEDURE — 25010000002 METRONIDAZOLE 500 MG/100ML SOLUTION: Performed by: FAMILY MEDICINE

## 2025-07-22 PROCEDURE — 25010000002 PROCHLORPERAZINE 10 MG/2ML SOLUTION: Performed by: STUDENT IN AN ORGANIZED HEALTH CARE EDUCATION/TRAINING PROGRAM

## 2025-07-22 PROCEDURE — 25010000002 CEFAZOLIN PER 500 MG: Performed by: STUDENT IN AN ORGANIZED HEALTH CARE EDUCATION/TRAINING PROGRAM

## 2025-07-22 PROCEDURE — 96376 TX/PRO/DX INJ SAME DRUG ADON: CPT

## 2025-07-22 PROCEDURE — 99204 OFFICE O/P NEW MOD 45 MIN: CPT | Performed by: STUDENT IN AN ORGANIZED HEALTH CARE EDUCATION/TRAINING PROGRAM

## 2025-07-22 PROCEDURE — 76705 ECHO EXAM OF ABDOMEN: CPT

## 2025-07-22 PROCEDURE — 25010000002 PROPOFOL 10 MG/ML EMULSION: Performed by: NURSE ANESTHETIST, CERTIFIED REGISTERED

## 2025-07-22 PROCEDURE — 25010000002 FENTANYL CITRATE (PF) 50 MCG/ML SOLUTION PREFILLED SYRINGE: Performed by: NURSE ANESTHETIST, CERTIFIED REGISTERED

## 2025-07-22 PROCEDURE — 85025 COMPLETE CBC W/AUTO DIFF WBC: CPT | Performed by: FAMILY MEDICINE

## 2025-07-22 PROCEDURE — 25010000002 SUGAMMADEX 200 MG/2ML SOLUTION: Performed by: NURSE ANESTHETIST, CERTIFIED REGISTERED

## 2025-07-22 PROCEDURE — 47562 LAPAROSCOPIC CHOLECYSTECTOMY: CPT | Performed by: STUDENT IN AN ORGANIZED HEALTH CARE EDUCATION/TRAINING PROGRAM

## 2025-07-22 PROCEDURE — 25010000002 METRONIDAZOLE 500 MG/100ML SOLUTION: Performed by: STUDENT IN AN ORGANIZED HEALTH CARE EDUCATION/TRAINING PROGRAM

## 2025-07-22 PROCEDURE — 25010000002 BUPIVACAINE (PF) 0.25 % SOLUTION: Performed by: STUDENT IN AN ORGANIZED HEALTH CARE EDUCATION/TRAINING PROGRAM

## 2025-07-22 PROCEDURE — 71045 X-RAY EXAM CHEST 1 VIEW: CPT

## 2025-07-22 PROCEDURE — 25010000002 CEFTRIAXONE PER 250 MG: Performed by: FAMILY MEDICINE

## 2025-07-22 PROCEDURE — 25010000002 MORPHINE PER 10 MG: Performed by: NURSE PRACTITIONER

## 2025-07-22 PROCEDURE — 25010000002 ONDANSETRON PER 1 MG: Performed by: NURSE PRACTITIONER

## 2025-07-22 PROCEDURE — 25010000002 ONDANSETRON PER 1 MG: Performed by: STUDENT IN AN ORGANIZED HEALTH CARE EDUCATION/TRAINING PROGRAM

## 2025-07-22 DEVICE — CLIP LIG HEMOLOK PA LG 6CT PRP: Type: IMPLANTABLE DEVICE | Site: ABDOMEN | Status: FUNCTIONAL

## 2025-07-22 RX ORDER — HYDROMORPHONE HYDROCHLORIDE 2 MG/ML
INJECTION, SOLUTION INTRAMUSCULAR; INTRAVENOUS; SUBCUTANEOUS AS NEEDED
Status: DISCONTINUED | OUTPATIENT
Start: 2025-07-22 | End: 2025-07-22 | Stop reason: SURG

## 2025-07-22 RX ORDER — SODIUM CHLORIDE, SODIUM LACTATE, POTASSIUM CHLORIDE, CALCIUM CHLORIDE 600; 310; 30; 20 MG/100ML; MG/100ML; MG/100ML; MG/100ML
100 INJECTION, SOLUTION INTRAVENOUS CONTINUOUS
Status: ACTIVE | OUTPATIENT
Start: 2025-07-22 | End: 2025-07-23

## 2025-07-22 RX ORDER — PROPOFOL 10 MG/ML
VIAL (ML) INTRAVENOUS AS NEEDED
Status: DISCONTINUED | OUTPATIENT
Start: 2025-07-22 | End: 2025-07-22 | Stop reason: SURG

## 2025-07-22 RX ORDER — ONDANSETRON 2 MG/ML
INJECTION INTRAMUSCULAR; INTRAVENOUS AS NEEDED
Status: DISCONTINUED | OUTPATIENT
Start: 2025-07-22 | End: 2025-07-22 | Stop reason: SURG

## 2025-07-22 RX ORDER — ACETAMINOPHEN 160 MG/5ML
650 SOLUTION ORAL EVERY 4 HOURS PRN
Status: DISCONTINUED | OUTPATIENT
Start: 2025-07-22 | End: 2025-07-23 | Stop reason: HOSPADM

## 2025-07-22 RX ORDER — LABETALOL HYDROCHLORIDE 5 MG/ML
5 INJECTION, SOLUTION INTRAVENOUS
Status: DISCONTINUED | OUTPATIENT
Start: 2025-07-22 | End: 2025-07-22 | Stop reason: HOSPADM

## 2025-07-22 RX ORDER — ALUMINA, MAGNESIA, AND SIMETHICONE 2400; 2400; 240 MG/30ML; MG/30ML; MG/30ML
15 SUSPENSION ORAL EVERY 6 HOURS PRN
Status: DISCONTINUED | OUTPATIENT
Start: 2025-07-22 | End: 2025-07-23 | Stop reason: HOSPADM

## 2025-07-22 RX ORDER — DEXAMETHASONE SODIUM PHOSPHATE 4 MG/ML
INJECTION, SOLUTION INTRA-ARTICULAR; INTRALESIONAL; INTRAMUSCULAR; INTRAVENOUS; SOFT TISSUE AS NEEDED
Status: DISCONTINUED | OUTPATIENT
Start: 2025-07-22 | End: 2025-07-22 | Stop reason: SURG

## 2025-07-22 RX ORDER — SODIUM CHLORIDE 0.9 % (FLUSH) 0.9 %
10 SYRINGE (ML) INJECTION EVERY 12 HOURS SCHEDULED
Status: DISCONTINUED | OUTPATIENT
Start: 2025-07-22 | End: 2025-07-22

## 2025-07-22 RX ORDER — MORPHINE SULFATE 2 MG/ML
2 INJECTION, SOLUTION INTRAMUSCULAR; INTRAVENOUS
Status: DISCONTINUED | OUTPATIENT
Start: 2025-07-22 | End: 2025-07-23 | Stop reason: HOSPADM

## 2025-07-22 RX ORDER — HYDRALAZINE HYDROCHLORIDE 20 MG/ML
5 INJECTION INTRAMUSCULAR; INTRAVENOUS
Status: DISCONTINUED | OUTPATIENT
Start: 2025-07-22 | End: 2025-07-22 | Stop reason: HOSPADM

## 2025-07-22 RX ORDER — NALOXONE HCL 0.4 MG/ML
0.4 VIAL (ML) INJECTION
Status: DISCONTINUED | OUTPATIENT
Start: 2025-07-22 | End: 2025-07-23 | Stop reason: HOSPADM

## 2025-07-22 RX ORDER — BISACODYL 5 MG/1
5 TABLET, DELAYED RELEASE ORAL DAILY PRN
Status: DISCONTINUED | OUTPATIENT
Start: 2025-07-22 | End: 2025-07-23 | Stop reason: HOSPADM

## 2025-07-22 RX ORDER — POLYETHYLENE GLYCOL 3350 17 G/17G
17 POWDER, FOR SOLUTION ORAL DAILY PRN
Status: DISCONTINUED | OUTPATIENT
Start: 2025-07-22 | End: 2025-07-23 | Stop reason: HOSPADM

## 2025-07-22 RX ORDER — DEXMEDETOMIDINE HYDROCHLORIDE 4 UG/ML
INJECTION, SOLUTION INTRAVENOUS AS NEEDED
Status: DISCONTINUED | OUTPATIENT
Start: 2025-07-22 | End: 2025-07-22 | Stop reason: SURG

## 2025-07-22 RX ORDER — ONDANSETRON 2 MG/ML
4 INJECTION INTRAMUSCULAR; INTRAVENOUS ONCE AS NEEDED
Status: DISCONTINUED | OUTPATIENT
Start: 2025-07-22 | End: 2025-07-22 | Stop reason: HOSPADM

## 2025-07-22 RX ORDER — PROCHLORPERAZINE EDISYLATE 5 MG/ML
5 INJECTION INTRAMUSCULAR; INTRAVENOUS EVERY 6 HOURS PRN
Status: DISCONTINUED | OUTPATIENT
Start: 2025-07-22 | End: 2025-07-23 | Stop reason: HOSPADM

## 2025-07-22 RX ORDER — ONDANSETRON 2 MG/ML
4 INJECTION INTRAMUSCULAR; INTRAVENOUS ONCE
Status: COMPLETED | OUTPATIENT
Start: 2025-07-22 | End: 2025-07-22

## 2025-07-22 RX ORDER — ACETAMINOPHEN 325 MG/1
650 TABLET ORAL EVERY 4 HOURS PRN
Status: DISCONTINUED | OUTPATIENT
Start: 2025-07-22 | End: 2025-07-23 | Stop reason: HOSPADM

## 2025-07-22 RX ORDER — ACETAMINOPHEN 650 MG/1
650 SUPPOSITORY RECTAL EVERY 4 HOURS PRN
Status: DISCONTINUED | OUTPATIENT
Start: 2025-07-22 | End: 2025-07-23 | Stop reason: HOSPADM

## 2025-07-22 RX ORDER — SODIUM CHLORIDE, SODIUM LACTATE, POTASSIUM CHLORIDE, CALCIUM CHLORIDE 600; 310; 30; 20 MG/100ML; MG/100ML; MG/100ML; MG/100ML
INJECTION, SOLUTION INTRAVENOUS CONTINUOUS PRN
Status: DISCONTINUED | OUTPATIENT
Start: 2025-07-22 | End: 2025-07-22 | Stop reason: SURG

## 2025-07-22 RX ORDER — SODIUM CHLORIDE 0.9 % (FLUSH) 0.9 %
10 SYRINGE (ML) INJECTION AS NEEDED
Status: DISCONTINUED | OUTPATIENT
Start: 2025-07-22 | End: 2025-07-22

## 2025-07-22 RX ORDER — ONDANSETRON 2 MG/ML
4 INJECTION INTRAMUSCULAR; INTRAVENOUS EVERY 6 HOURS PRN
Status: DISCONTINUED | OUTPATIENT
Start: 2025-07-22 | End: 2025-07-23 | Stop reason: HOSPADM

## 2025-07-22 RX ORDER — MIDAZOLAM HYDROCHLORIDE 2 MG/2ML
INJECTION, SOLUTION INTRAMUSCULAR; INTRAVENOUS AS NEEDED
Status: DISCONTINUED | OUTPATIENT
Start: 2025-07-22 | End: 2025-07-22 | Stop reason: SURG

## 2025-07-22 RX ORDER — SODIUM CHLORIDE 9 MG/ML
40 INJECTION, SOLUTION INTRAVENOUS AS NEEDED
Status: DISCONTINUED | OUTPATIENT
Start: 2025-07-22 | End: 2025-07-22 | Stop reason: HOSPADM

## 2025-07-22 RX ORDER — SODIUM CHLORIDE 9 MG/ML
40 INJECTION, SOLUTION INTRAVENOUS AS NEEDED
Status: DISCONTINUED | OUTPATIENT
Start: 2025-07-22 | End: 2025-07-23 | Stop reason: HOSPADM

## 2025-07-22 RX ORDER — METRONIDAZOLE 500 MG/100ML
500 INJECTION, SOLUTION INTRAVENOUS EVERY 8 HOURS
Status: DISCONTINUED | OUTPATIENT
Start: 2025-07-22 | End: 2025-07-23 | Stop reason: HOSPADM

## 2025-07-22 RX ORDER — KETAMINE HCL IN NACL, ISO-OSM 100MG/10ML
SYRINGE (ML) INJECTION AS NEEDED
Status: DISCONTINUED | OUTPATIENT
Start: 2025-07-22 | End: 2025-07-22 | Stop reason: SURG

## 2025-07-22 RX ORDER — DEXAMETHASONE SODIUM PHOSPHATE 10 MG/ML
8 INJECTION, SOLUTION INTRAMUSCULAR; INTRAVENOUS ONCE AS NEEDED
Status: DISCONTINUED | OUTPATIENT
Start: 2025-07-22 | End: 2025-07-22 | Stop reason: HOSPADM

## 2025-07-22 RX ORDER — LAMOTRIGINE 100 MG/1
150 TABLET ORAL DAILY
COMMUNITY

## 2025-07-22 RX ORDER — IOPAMIDOL 612 MG/ML
100 INJECTION, SOLUTION INTRAVASCULAR
Status: COMPLETED | OUTPATIENT
Start: 2025-07-22 | End: 2025-07-22

## 2025-07-22 RX ORDER — HYDROCODONE BITARTRATE AND ACETAMINOPHEN 5; 325 MG/1; MG/1
1 TABLET ORAL EVERY 6 HOURS PRN
Status: DISCONTINUED | OUTPATIENT
Start: 2025-07-22 | End: 2025-07-23 | Stop reason: HOSPADM

## 2025-07-22 RX ORDER — SODIUM CHLORIDE 0.9 % (FLUSH) 0.9 %
10 SYRINGE (ML) INJECTION AS NEEDED
Status: DISCONTINUED | OUTPATIENT
Start: 2025-07-22 | End: 2025-07-22 | Stop reason: HOSPADM

## 2025-07-22 RX ORDER — SODIUM CHLORIDE 0.9 % (FLUSH) 0.9 %
10 SYRINGE (ML) INJECTION EVERY 12 HOURS SCHEDULED
Status: DISCONTINUED | OUTPATIENT
Start: 2025-07-22 | End: 2025-07-22 | Stop reason: HOSPADM

## 2025-07-22 RX ORDER — AMOXICILLIN 250 MG
2 CAPSULE ORAL 2 TIMES DAILY PRN
Status: DISCONTINUED | OUTPATIENT
Start: 2025-07-22 | End: 2025-07-23 | Stop reason: HOSPADM

## 2025-07-22 RX ORDER — FENTANYL CITRATE 50 UG/ML
INJECTION, SOLUTION INTRAMUSCULAR; INTRAVENOUS AS NEEDED
Status: DISCONTINUED | OUTPATIENT
Start: 2025-07-22 | End: 2025-07-22 | Stop reason: SURG

## 2025-07-22 RX ORDER — BISACODYL 10 MG
10 SUPPOSITORY, RECTAL RECTAL DAILY PRN
Status: DISCONTINUED | OUTPATIENT
Start: 2025-07-22 | End: 2025-07-23 | Stop reason: HOSPADM

## 2025-07-22 RX ORDER — ROCURONIUM BROMIDE 50 MG/5 ML
SYRINGE (ML) INTRAVENOUS AS NEEDED
Status: DISCONTINUED | OUTPATIENT
Start: 2025-07-22 | End: 2025-07-22 | Stop reason: SURG

## 2025-07-22 RX ORDER — LIDOCAINE HYDROCHLORIDE 10 MG/ML
INJECTION, SOLUTION INFILTRATION; PERINEURAL AS NEEDED
Status: DISCONTINUED | OUTPATIENT
Start: 2025-07-22 | End: 2025-07-22 | Stop reason: HOSPADM

## 2025-07-22 RX ORDER — BUPIVACAINE HYDROCHLORIDE 2.5 MG/ML
INJECTION, SOLUTION EPIDURAL; INFILTRATION; INTRACAUDAL; PERINEURAL AS NEEDED
Status: DISCONTINUED | OUTPATIENT
Start: 2025-07-22 | End: 2025-07-22 | Stop reason: HOSPADM

## 2025-07-22 RX ORDER — ONDANSETRON 4 MG/1
4 TABLET, ORALLY DISINTEGRATING ORAL EVERY 6 HOURS PRN
Status: DISCONTINUED | OUTPATIENT
Start: 2025-07-22 | End: 2025-07-23 | Stop reason: HOSPADM

## 2025-07-22 RX ORDER — IPRATROPIUM BROMIDE AND ALBUTEROL SULFATE 2.5; .5 MG/3ML; MG/3ML
3 SOLUTION RESPIRATORY (INHALATION) ONCE AS NEEDED
Status: DISCONTINUED | OUTPATIENT
Start: 2025-07-22 | End: 2025-07-22 | Stop reason: HOSPADM

## 2025-07-22 RX ORDER — INDOCYANINE GREEN AND WATER 25 MG
2.5 KIT INJECTION ONCE
Status: COMPLETED | OUTPATIENT
Start: 2025-07-22 | End: 2025-07-22

## 2025-07-22 RX ADMIN — ONDANSETRON 4 MG: 2 INJECTION INTRAMUSCULAR; INTRAVENOUS at 11:14

## 2025-07-22 RX ADMIN — DEXMEDETOMIDINE HYDROCHLORIDE IN 0.9% SODIUM CHLORIDE 4 MCG: 4 INJECTION INTRAVENOUS at 11:46

## 2025-07-22 RX ADMIN — DEXMEDETOMIDINE HYDROCHLORIDE IN 0.9% SODIUM CHLORIDE 4 MCG: 4 INJECTION INTRAVENOUS at 12:01

## 2025-07-22 RX ADMIN — METRONIDAZOLE 500 MG: 500 INJECTION, SOLUTION INTRAVENOUS at 17:34

## 2025-07-22 RX ADMIN — DEXAMETHASONE SODIUM PHOSPHATE 8 MG: 4 INJECTION, SOLUTION INTRA-ARTICULAR; INTRALESIONAL; INTRAMUSCULAR; INTRAVENOUS; SOFT TISSUE at 11:21

## 2025-07-22 RX ADMIN — PROPOFOL 180 MG: 10 INJECTION, EMULSION INTRAVENOUS at 11:14

## 2025-07-22 RX ADMIN — ONDANSETRON 4 MG: 2 INJECTION INTRAMUSCULAR; INTRAVENOUS at 06:25

## 2025-07-22 RX ADMIN — ACETAMINOPHEN 650 MG: 325 TABLET, FILM COATED ORAL at 09:02

## 2025-07-22 RX ADMIN — INDOCYANINE GREEN AND WATER 2.5 MG: KIT at 10:40

## 2025-07-22 RX ADMIN — HYDROMORPHONE HYDROCHLORIDE 0.6 MG: 2 INJECTION, SOLUTION INTRAMUSCULAR; INTRAVENOUS; SUBCUTANEOUS at 11:25

## 2025-07-22 RX ADMIN — SODIUM CHLORIDE, POTASSIUM CHLORIDE, SODIUM LACTATE AND CALCIUM CHLORIDE: 600; 310; 30; 20 INJECTION, SOLUTION INTRAVENOUS at 11:03

## 2025-07-22 RX ADMIN — MORPHINE SULFATE 2 MG: 2 INJECTION, SOLUTION INTRAMUSCULAR; INTRAVENOUS at 07:23

## 2025-07-22 RX ADMIN — IOPAMIDOL 100 ML: 612 INJECTION, SOLUTION INTRAVENOUS at 00:09

## 2025-07-22 RX ADMIN — METRONIDAZOLE 500 MG: 500 INJECTION, SOLUTION INTRAVENOUS at 03:46

## 2025-07-22 RX ADMIN — Medication 50 MG: at 11:14

## 2025-07-22 RX ADMIN — CEFTRIAXONE SODIUM 1000 MG: 1 INJECTION, POWDER, FOR SOLUTION INTRAMUSCULAR; INTRAVENOUS at 03:45

## 2025-07-22 RX ADMIN — Medication 10 ML: at 03:47

## 2025-07-22 RX ADMIN — MIDAZOLAM HYDROCHLORIDE 1 MG: 1 INJECTION, SOLUTION INTRAMUSCULAR; INTRAVENOUS at 11:03

## 2025-07-22 RX ADMIN — HYDROCODONE BITARTRATE AND ACETAMINOPHEN 1 TABLET: 5; 325 TABLET ORAL at 15:44

## 2025-07-22 RX ADMIN — HYDROMORPHONE HYDROCHLORIDE 0.4 MG: 2 INJECTION, SOLUTION INTRAMUSCULAR; INTRAVENOUS; SUBCUTANEOUS at 11:32

## 2025-07-22 RX ADMIN — SUGAMMADEX 200 MG: 100 INJECTION, SOLUTION INTRAVENOUS at 12:33

## 2025-07-22 RX ADMIN — Medication 20 MG: at 11:03

## 2025-07-22 RX ADMIN — SODIUM CHLORIDE, POTASSIUM CHLORIDE, SODIUM LACTATE AND CALCIUM CHLORIDE 100 ML/HR: 600; 310; 30; 20 INJECTION, SOLUTION INTRAVENOUS at 03:54

## 2025-07-22 RX ADMIN — DEXMEDETOMIDINE HYDROCHLORIDE IN 0.9% SODIUM CHLORIDE 4 MCG: 4 INJECTION INTRAVENOUS at 12:14

## 2025-07-22 RX ADMIN — PROCHLORPERAZINE EDISYLATE 5 MG: 5 INJECTION INTRAMUSCULAR; INTRAVENOUS at 08:45

## 2025-07-22 RX ADMIN — FENTANYL CITRATE 100 MCG: 50 INJECTION, SOLUTION INTRAMUSCULAR; INTRAVENOUS at 11:03

## 2025-07-22 RX ADMIN — MORPHINE SULFATE 2 MG: 2 INJECTION, SOLUTION INTRAMUSCULAR; INTRAVENOUS at 17:04

## 2025-07-22 RX ADMIN — ACETAMINOPHEN 650 MG: 325 TABLET, FILM COATED ORAL at 20:02

## 2025-07-22 RX ADMIN — ONDANSETRON 4 MG: 2 INJECTION, SOLUTION INTRAMUSCULAR; INTRAVENOUS at 01:37

## 2025-07-22 RX ADMIN — MORPHINE SULFATE 2 MG: 2 INJECTION, SOLUTION INTRAMUSCULAR; INTRAVENOUS at 21:58

## 2025-07-22 RX ADMIN — LIDOCAINE HYDROCHLORIDE 60 MG: 20 INJECTION, SOLUTION INTRAVENOUS at 11:14

## 2025-07-22 RX ADMIN — MORPHINE SULFATE 4 MG: 4 INJECTION, SOLUTION INTRAMUSCULAR; INTRAVENOUS at 01:37

## 2025-07-22 RX ADMIN — CEFAZOLIN 2000 MG: 2 INJECTION, POWDER, FOR SOLUTION INTRAMUSCULAR; INTRAVENOUS at 10:40

## 2025-07-22 RX ADMIN — DEXMEDETOMIDINE HYDROCHLORIDE IN 0.9% SODIUM CHLORIDE 8 MCG: 4 INJECTION INTRAVENOUS at 11:32

## 2025-07-22 NOTE — PROGRESS NOTES
[facility]    Name:  Michael Paz   Age:  59 y.o.  Sex:  male  :  1965  MRN:  [id number]   Visit Number:  [id number]  Admission Date:  2025  Date Of Service:  25  Primary Care Physician:  Jenniffer Lima APRN     LOS: 0 days :    Chief Complaint:      Follow-up; abdominal pain and nausea    Subjective:    Says he feels a little rough after surgery, abdomen is sore.  No vomiting.    Hospital Course:    Michael Paz is a 59-year-old with history of diabetes, hypertension, hyperlipidemia, bipolar disorder, who presented to the emergency room with complaints of abdominal pain.  He states it started on morning of 2025 at around 1030.  Had pain in the mid to right side of his abdomen, no radiation.  Felt nauseous.  Had eaten a salad at last meal on evening before.  Pain progressed throughout the day and thus come to the emergency room.  Denies any changes in bowel habits.  No fevers or chills.  Somewhat improved after interventions in the emergency room now.     In the ER he was overall hemodynamically stable.  His labs were showing a white count of 10 hemoglobin 13 lipase 19 lactic acid 1.5.  CMP demonstrated creatinine 1.26 with an ALT of 106  and a total bili of 1.3.  He underwent a CT scan abdomen pelvis with contrast which had demonstrated concerns of a distended gallbladder, cholelithiasis, could reflect cholecystitis.  Patient was given pain control and antiemetics.  ER provider discussed case with general surgery, Dr. Carpenter, who recommended abdominal ultrasound.      Review of Systems:     All systems were reviewed and negative except as mentioned in subjective, assessment and plan.    Vital Signs:    Temp:  [97.5 °F (36.4 °C)-102 °F (38.9 °C)] 100.8 °F (38.2 °C)  Heart Rate:  [71-92] 92  Resp:  [16-20] 16  BP: (129-159)/() 147/89    Intake and output:    I/O last 3 completed shifts:  In: 246.7 [I.V.:246.7]  Out: -   No  "intake/output data recorded.    Physical Examination:    General Appearance:  Alert and cooperative.    Head:  Atraumatic and normocephalic.   Eyes: Conjunctivae and sclerae normal, no icterus. No pallor.   Throat: No oral lesions, no thrush, oral mucosa moist.   Neck: Supple, trachea midline, no thyromegaly.   Lungs:   Breath sounds heard bilaterally equally.  No wheezing or crackles. No Pleural rub or bronchial breathing.   Heart:  Normal S1 and S2, no murmur, no gallop, no rub. No JVD.   Abdomen:   Normal bowel sounds, no masses, no organomegaly. Soft, generalized tenderness, nondistended, no rebound tenderness.   Extremities: Supple, no edema, no cyanosis, no clubbing.   Skin: Warm.   Neurologic: Alert and oriented x 3. No facial asymmetry. Moves all four limbs. No tremors.      Laboratory results:    Results from last 7 days   Lab Units 07/22/25  0559 07/21/25  2209   SODIUM mmol/L 142 141   POTASSIUM mmol/L 3.8 4.0   CHLORIDE mmol/L 107 108*   CO2 mmol/L 22.7 18.5*   BUN mg/dL 15.0 15.0   CREATININE mg/dL 1.34* 1.26   CALCIUM mg/dL 8.9 9.2   BILIRUBIN mg/dL 0.6 1.3*   ALK PHOS U/L 109 99   ALT (SGPT) U/L 174* 106*   AST (SGOT) U/L 190* 177*   GLUCOSE mg/dL 121* 125*     Results from last 7 days   Lab Units 07/22/25  0559 07/21/25  2209   WBC 10*3/mm3 11.25* 10.60   HEMOGLOBIN g/dL 12.8* 13.4   HEMATOCRIT % 36.6* 38.2   PLATELETS 10*3/mm3 225 247                 No results for input(s): \"PHART\", \"QXM9EER\", \"PO2ART\", \"FMF2XVB\", \"BASEEXCESS\" in the last 8760 hours.   I have reviewed the patient's laboratory results.    Radiology results:    US Gallbladder  Result Date: 7/22/2025  RIGHT UPPER QUADRANT ULTRASOUND  HISTORY: concern cholecystitis  COMPARISON: None.  PROCEDURE: Ultrasound images of the right upper quadrant were obtained.  FINDINGS:  The liver parenchyma is of proper echogenicity.  There is no focal abnormality. The gallbladder is distended as seen on CT performed earlier the same day. There are  no " gallstones. There is a nondependent, echogenic 3 mm structure within the gallbladder that does not demonstrate internal flow. Consider small polyp versus adherent sludge ball. No evidence of ductal dilatation is identified. Limited images of the pancreas are  obscured by bowel gas. [  ]      Impression: Distended gallbladder with no wall thickening or pericholecystic fluid that contains a 3 mm polyp versus adherent sludge ball.    This report was signed and finalized on 7/22/2025 9:20 AM by Suma Quiroz MD.      CT Abdomen Pelvis With Contrast  Result Date: 7/22/2025  FINAL REPORT TECHNIQUE: null CLINICAL HISTORY: Abdominal pain COMPARISON: null FINDINGS: CT abdomen and pelvis with IV contrast. Comparison: None FINDINGS: Lung bases: No soft tissue nodule, infiltrate, or pleural effusion. Upper Abdomen: Mild hepatosplenomegaly without discrete lesion. Moderate distention of the gallbladder without radiopaque/radiolucent calculus. Normal caliber CBD. Unremarkable pancreas. Contrast opacified portal, superior mesenteric, splenic, and hepatic veins without filling defects. Retroperitoneum/Pelvis: No adrenal mass. Abdominal aorta is of normal caliber with moderate calcific atheromatous change. No pathologic para-aortic adenopathy. Kidneys without solid mass or hydronephrosis. No kidney stones. Upper left kidney 2.6 x 2.0 cm cortical cysts. Nondilated ureters. Nondistended urinary bladder. Small volume water density free fluid in the dependent pelvis. Bowel/Mesentery: There is normal stool volume within the colon. No evidence of mass/obstruction. No diverticular disease or inflammation. Appendix is normal. Small bowel loops are nondilated, without evidence of mural thickening. No mesenteric mass or pathologic adenopathy. Patent superior mesenteric artery and vein. No focal gastric abnormality. No free air or fluid. Bone/Extraperitoneal Soft Tissues: Bilateral L5 pars defects without listhesis. Multilevel mild degenerative  disc and facet disease with mild apex right scoliosis. Unremarkable bony pelvis. No ventral wall hernia.     Impression: IMPRESSION: 1. No bowel obstruction or inflammation, free air or fluid. 2. Distended gallbladder may reflect acute cholecystitis, noting that about 25% of gallstones are isodense on CT scan. 3. Mild hepatosplenomegaly. 4. Other findings above. Authenticated and Electronically Signed by Bronson Rubin MD on 07/22/2025 12:52:37 AM    I have reviewed the patient's radiology reports.    Medication Review:     I have reviewed the patient's active and prn medications.     Problem List:      Cholelithiasis      Assessment/Plan:    Observation General Floor admission early a.m. 7/22/2025 with cholecystitis.    Cholecystitis  Cholelithiasis  General Surgery consultation, recommendations appreciated.  Rocephin and Flagyl.  IVF.  OR 7/22.  ROSA  IVF.    Chronic: diabetes, hypertension, hyperlipidemia, bipolar disorder  Subcutaneous insulin protocol.    DVT Prophylaxis: SCDs  Code Status: Full code  Diet: N.p.o.  Discharge Plan: Pending GS recommendation    Brian Joseph Kerley,   07/22/25  11:10 EDT    Dictated utilizing Dragon dictation.    I have reviewed the copied text and it is accurate as of 7/22/2025

## 2025-07-22 NOTE — PLAN OF CARE
Problem: Adult Inpatient Plan of Care  Goal: Plan of Care Review  Outcome: Progressing  Goal: Patient-Specific Goal (Individualized)  Outcome: Progressing  Goal: Absence of Hospital-Acquired Illness or Injury  Outcome: Progressing  Intervention: Identify and Manage Fall Risk  Recent Flowsheet Documentation  Taken 7/22/2025 1500 by Dayanara Nair RN  Safety Promotion/Fall Prevention: safety round/check completed  Taken 7/22/2025 1426 by Dayanara Nair RN  Safety Promotion/Fall Prevention: safety round/check completed  Taken 7/22/2025 1231 by Dayanara Nair RN  Safety Promotion/Fall Prevention: patient off unit  Taken 7/22/2025 1102 by Dayanara Nair RN  Safety Promotion/Fall Prevention: patient off unit  Taken 7/22/2025 1018 by Dayanara Nair RN  Safety Promotion/Fall Prevention: patient off unit  Taken 7/22/2025 0850 by Dayanara Nair RN  Safety Promotion/Fall Prevention: safety round/check completed  Intervention: Prevent Skin Injury  Recent Flowsheet Documentation  Taken 7/22/2025 1500 by Dayanara Nair RN  Body Position: position changed independently  Taken 7/22/2025 1426 by Dayanara Nair RN  Body Position: position changed independently  Taken 7/22/2025 0850 by Dayanara Nair RN  Body Position: position changed independently  Intervention: Prevent Infection  Recent Flowsheet Documentation  Taken 7/22/2025 1500 by Dayanara Nair RN  Infection Prevention: single patient room provided  Taken 7/22/2025 1426 by Dayanara Nair RN  Infection Prevention: single patient room provided  Taken 7/22/2025 0850 by Dayanara Nair RN  Infection Prevention: single patient room provided  Goal: Optimal Comfort and Wellbeing  Outcome: Progressing  Intervention: Provide Person-Centered Care  Recent Flowsheet Documentation  Taken 7/22/2025 0850 by Dayanara Nair RN  Trust Relationship/Rapport:   care explained   choices provided  Goal: Readiness for Transition of Care  Outcome: Progressing     Problem:  Pain Acute  Goal: Optimal Pain Control and Function  Outcome: Progressing  Intervention: Prevent or Manage Pain  Recent Flowsheet Documentation  Taken 7/22/2025 1500 by Dayanara Nair, RN  Medication Review/Management: medications reviewed  Taken 7/22/2025 1426 by Dayanara Nair, RN  Medication Review/Management: medications reviewed  Taken 7/22/2025 0850 by Dayanara Nair, RN  Medication Review/Management: medications reviewed   Goal Outcome Evaluation:

## 2025-07-22 NOTE — OP NOTE
PATIENT:     Michael Paz    DATE OF SURGERY:     7/22/2025    PHYSICIAN:   Marcela Carpenter DO    REFERRING PHYSICIAN:  Jenniffer Lima APRN    YOB: 1965    PREOPERATIVE DIAGNOSIS:  Acute cholecystitis    POSTOPERATIVE DIAGNOSIS:  Acute gangrenous cholecystitis    PROCEDURE:  Robotic-assisted laparoscopic cholecystectomy with indocyanine green (ICG)    EBL:  Less than 50 cc    COMPLICATIONS:  None    ANESTHESIA:  General endotracheal.    SPECIMEN: Gallbladder    HISTORY:  Patient is a 59 year old male that presented to the ED with complaint of RUQ abdominal pain. History, physical, laboratory studies, and imaging consistent with acute cholecystitis.     OPERATIVE PROCEDURE:  The patient was seen in the preoperative area. History and physical was reviewed, consent was verified, and all questions were answered. IV indocyanine green was injected preoperatively. The patient was taken to the operating room, placed in the supine position, and given general endotracheal anesthesia.  The patient was prepped and draped in the normal sterile fashion, and also received preoperative IV antibiotics.  An appropriate timeout was performed by the nursing staff prior to the incision.    A periumbilical incision was made and deepened to the fascia. Utilizing a modified Jose J technique, the abdomen was entered under direct visualization. An 8 mm robotic trocar was inserted and the abdomen was insufflated to a pressure of 15 mmHg. Patient tolerated insufflation well. A laparoscope was inserted and the abdomen was inspected. No injury from initial port placement was identified. Additional ports were placed under direct visualization in the following fashion: two 8 mm ports in the left upper abdomen and one 8 mm port in the upper right abdomen. The meebeei robot was draped in sterile fashion and was docked to the ports in standard fashion.     The fundus of the gallbladder was grasped and retracted toward the  patient's right shoulder. The gallbladder was visualized completely and noted to be acutely inflamed and partially gangrenous. Multiple omental adhesions were taken down with electrocautery. The gallbladder infundibulum was grasped and retracted toward the right lower quadrant of the abdomen to expose the hilar structures. Dissection in the triangle of Calot was carried out until the critical view of safety was obtained, identifying the cystic duct and artery as the only two structures entering the gallbladder. Indocyanine green was used to visualize the biliary anatomy. Two hemoclips were placed proximally on the cystic duct and one distally. The duct was divided. The cystic artery was similarly clipped and divided. Bovie electrocautery was then used to remove the gallbladder from the liver bed.  It was then placed into a laparoscopic retrieval bag and removed via the periumbilical port.     The gallbladder fossa was inspected. Hemostasis was excellent and there was no evidence of biliary leakage. All trocar sites were injected with a local anesthetic mixture.  Trocars were removed under direct vision and the fascia was closed with 0 Vicryl suture and 4-0 Vicryl subcuticular stitch. The skin was dressed with surgical glue.    The patient was stable at this point and subsequently transferred back to the recovery room in stable condition. Attempted to call patient's spouse to update, but no answer.    Marcela Carpenter, DO

## 2025-07-22 NOTE — CONSULTS
Referring Provider: No ref. provider found    Reason for Consultation: Cholelithiasis, abdominal pain, possible cholecystitis    Patient Care Team:  Jenniffer Lima APRN as PCP - General (Family Medicine)  Pepe Alfaro MD as Consulting Physician (General Surgery)    Chief complaint   Chief Complaint   Patient presents with    Abdominal Pain       SUBJECTIVE:    History of present illness:  Patient is a 59 year old male with PMH DM2, HTN, HLD, bipolar disorder that presented to the ED yesterday evening with complaint of RUQ abdominal pain that started around 11AM. Patient states that he had a salad with cheese and Yoruba dressing the night prior, then he awoke the next morning with severe right upper quadrant pain. He states he had a bowel movement that was diarrhea at that time. He reports nausea without vomiting. Patient denies issues with his gallbladder or liver in the past.     In the ED, the patient was afebrile and hemodynamically stable. Laboratory studies were significant for mildly elevated LFTs with ,  and Tbili 1.3. Lactic acid WNL. CT abdomen was completed that demonstrated distended gallbladder that may reflect acute cholecystitis, noting about 25% of gallstones are isodense. Unfortunately, US was not available overnight so patient was admitted for observation and underwent GBUS this morning. This confirmed distended gallbladder without wall thickening or pericholecystic fluid. There is also a 3mm polyp versus sludge ball.     Patient was seen and examined at bedside this morning and he did spike fever to 102 this morning. Additionally WBC increased to 11 today and transaminases also increasing. Patient reports ongoing severe RUQ pain.      Review of Systems:    Review of Systems - General ROS: negative for - chills, fatigue, fever, hot flashes, malaise or night sweats  Psychological ROS: negative for - Depression or anxiety  HEENT ROS: negative for -  No nasal/oral pharynx  drainage or pain. No acute visual complaints.  Respiratory ROS: negative for - Shortness of breath, cough or hemoptysis.  Cardiovascular ROS: negative for - Chest pain or palpitations. No edema  Gastrointestinal ROS: positive for abdominal pain, nausea, and diarrhea.  Genito-Urinary ROS: negative for - dysuria or hematuria  Musculoskeletal ROS: negative for - gait disturbance or muscle pain  Neurological ROS: negative for - dizziness, gait disturbance, memory loss, numbness/tingling or seizures  Skin: no rash    History  Past Medical History:   Diagnosis Date    Acid reflux     Asthma     Depression     Diabetes mellitus     Heart murmur     Hyperlipidemia     Hypertension     Polyp of colon 2007    Wears glasses        Past Surgical History:   Procedure Laterality Date    COLONOSCOPY  2007    Dr Jef Hays [facility]    COLONOSCOPY N/A 3/19/2019    Procedure: COLONOSCOPY;  Surgeon: Pepe Alfaro MD;  Location: [facility];  Service: Gastroenterology    MULTIPLE TOOTH EXTRACTIONS         Family History   Problem Relation Age of Onset    Heart disease Father     Hyperlipidemia Father     Hypertension Father     Kidney disease Brother     Stroke Maternal Grandmother        Social History     Socioeconomic History    Marital status:    Tobacco Use    Smoking status: Never    Smokeless tobacco: Former   Vaping Use    Vaping status: Never Used   Substance and Sexual Activity    Alcohol use: No    Drug use: Yes     Types: Marijuana    Sexual activity: Defer       Allergies   Allergen Reactions    Penicillins Hives       OBJECTIVE:    Medications  Current Facility-Administered Medications   Medication Dose Route Frequency Provider Last Rate Last Admin    acetaminophen (TYLENOL) tablet 650 mg  650 mg Oral Q4H PRN Kajal Mena DO   650 mg at 07/22/25 0902    Or    acetaminophen (TYLENOL) 160 MG/5ML oral solution 650 mg  650 mg Oral Q4H PRN Kajal Mena DO         Or    acetaminophen (TYLENOL) suppository 650 mg  650 mg Rectal Q4H PRN Kajal Mena DO        aluminum-magnesium hydroxide-simethicone (MAALOX MAX) 400-400-40 MG/5ML suspension 15 mL  15 mL Oral Q6H PRN Kajal Mena DO        sennosides-docusate (PERICOLACE) 8.6-50 MG per tablet 2 tablet  2 tablet Oral BID PRN Kajal Mena DO        And    polyethylene glycol (MIRALAX) packet 17 g  17 g Oral Daily PRN Kajal Mena DO        And    bisacodyl (DULCOLAX) EC tablet 5 mg  5 mg Oral Daily PRN Kajal Mena DO        And    bisacodyl (DULCOLAX) suppository 10 mg  10 mg Rectal Daily PRN Kajal Mena DO        Calcium Replacement - Follow Nurse / BPA Driven Protocol   Not Applicable PRN Kajal Mena DO        cefTRIAXone (ROCEPHIN) 1,000 mg in sodium chloride 0.9 % 100 mL IVPB-VTB  1,000 mg Intravenous Q24H Kajal Mena  mL/hr at 07/22/25 0345 1,000 mg at 07/22/25 0345    lactated ringers infusion  100 mL/hr Intravenous Continuous Kajal Mena  mL/hr at 07/22/25 0622 100 mL/hr at 07/22/25 0622    Magnesium Standard Dose Replacement - Follow Nurse / BPA Driven Protocol   Not Applicable PRN Kajal Mena DO        metroNIDAZOLE (FLAGYL) IVPB 500 mg  500 mg Intravenous Q8H Kajal Mena  mL/hr at 07/22/25 0346 500 mg at 07/22/25 0346    Morphine sulfate (PF) injection 2 mg  2 mg Intravenous Q3H PRN Kajal Mena DO   2 mg at 07/22/25 0723    And    naloxone (NARCAN) injection 0.4 mg  0.4 mg Intravenous Q5 Min PRN Kajal Mena DO        ondansetron ODT (ZOFRAN-ODT) disintegrating tablet 4 mg  4 mg Oral Q6H PRN Kajal Mena DO        Or    ondansetron (ZOFRAN) injection 4 mg  4 mg Intravenous Q6H PRN Kajal Mena DO   4 mg at 07/22/25 0625    Phosphorus Replacement - Follow Nurse / BPA Driven Protocol   Not Applicable PRKajal Porter DO         Potassium Replacement - Follow Nurse / BPA Driven Protocol   Not Applicable PRN Karrick, Kajal Ryan, DO        prochlorperazine (COMPAZINE) injection 5 mg  5 mg Intravenous Q6H PRN Kerley, Brian Chet, DO   5 mg at 07/22/25 0845    sodium chloride 0.9 % flush 10 mL  10 mL Intravenous PRN Rajesh, Kajal Ryan, DO        sodium chloride 0.9 % flush 10 mL  10 mL Intravenous Q12H Rajesh, Kajal Ryan, DO   10 mL at 07/22/25 0347    sodium chloride 0.9 % flush 10 mL  10 mL Intravenous PRN Rajesh, Kajal Ryan, DO        sodium chloride 0.9 % infusion 40 mL  40 mL Intravenous PRN Karamy, Kajal Ryan, DO             Vital Signs   Temp:  [97.5 °F (36.4 °C)-102 °F (38.9 °C)] 102 °F (38.9 °C)  Heart Rate:  [71-92] 92  Resp:  [16-20] 16  BP: (129-159)/() 147/89    Physical Exam:     General Appearance:  Alert and cooperative, not in any acute distress.   Head:  Atraumatic and normocephalic, without obvious abnormality.   Eyes:          PERRLA, conjunctivae and sclerae normal, no Icterus. No pallor. Extraocular movements are within normal limits.   Throat: No oral lesions, no thrush, oral mucosa moist.   Neck: Supple, trachea midline, no thyromegaly, no carotid bruit.   Respiratory/Lungs:   Breath sounds heard bilaterally equally.  No crackles or wheezing. No Pleural rub or bronchial breathing. Normal respiratory effort.    Cardiovascular/Heart:  Normal S1 and S2, no murmur. No edema   GI/Abdomen:   Soft, mildly distended, tender to palpation in RUQ, positive Rodriguez's sign.                 Musculoskeletal/ Extremities:   Moves all extremities well   Skin: No bleeding, bruising or rash, no induration   Psychiatric : Alert and oriented ×3.  No depression or anxiety    Neurologic: Cranial nerves 2 - 12 grossly intact, sensation intact, Motor power is normal and equal bilaterally.     Results Review:  Lab Results (last 24 hours)       Procedure Component Value Units Date/Time    Comprehensive Metabolic Panel  [[id number]]  (Abnormal) Collected: 07/22/25 0559    Specimen: Blood Updated: 07/22/25 0648     Glucose 121 mg/dL      BUN 15.0 mg/dL      Creatinine 1.34 mg/dL      Sodium 142 mmol/L      Potassium 3.8 mmol/L      Chloride 107 mmol/L      CO2 22.7 mmol/L      Calcium 8.9 mg/dL      Total Protein 6.7 g/dL      Albumin 4.3 g/dL      ALT (SGPT) 174 U/L      AST (SGOT) 190 U/L      Alkaline Phosphatase 109 U/L      Total Bilirubin 0.6 mg/dL      Globulin 2.4 gm/dL      A/G Ratio 1.8 g/dL      BUN/Creatinine Ratio 11.2     Anion Gap 12.3 mmol/L      eGFR 61.0 mL/min/1.73     Narrative:      GFR Categories in Chronic Kidney Disease (CKD)              GFR Category          GFR (mL/min/1.73)    Interpretation  G1                    90 or greater        Normal or high (1)  G2                    60-89                Mild decrease (1)  G3a                   45-59                Mild to moderate decrease  G3b                   30-44                Moderate to severe decrease  G4                    15-29                Severe decrease  G5                    14 or less           Kidney failure    (1)In the absence of evidence of kidney disease, neither GFR category G1 or G2 fulfill the criteria for CKD.    eGFR calculation 2021 CKD-EPI creatinine equation, which does not include race as a factor    CBC Auto Differential [[id number]]  (Abnormal) Collected: 07/22/25 0559    Specimen: Blood Updated: 07/22/25 0633     WBC 11.25 10*3/mm3      RBC 4.20 10*6/mm3      Hemoglobin 12.8 g/dL      Hematocrit 36.6 %      MCV 87.1 fL      MCH 30.5 pg      MCHC 35.0 g/dL      RDW 12.3 %      RDW-SD 39.6 fl      MPV 9.7 fL      Platelets 225 10*3/mm3      Neutrophil % 78.2 %      Lymphocyte % 12.4 %      Monocyte % 6.8 %      Eosinophil % 1.7 %      Basophil % 0.4 %      Immature Grans % 0.5 %      Neutrophils, Absolute 8.80 10*3/mm3      Lymphocytes, Absolute 1.39 10*3/mm3      Monocytes, Absolute 0.77 10*3/mm3      Eosinophils, Absolute 0.19  10*3/mm3      Basophils, Absolute 0.04 10*3/mm3      Immature Grans, Absolute 0.06 10*3/mm3      nRBC 0.0 /100 WBC     Ethanol [[id number]] Collected: 07/21/25 2209    Specimen: Blood Updated: 07/22/25 0225     Ethanol <10 mg/dL      Ethanol % <0.010 %     Narrative:      Not for legal purposes.    Urinalysis, Microscopic Only - Urine, Clean Catch [[id number]]  (Abnormal) Collected: 07/21/25 2328    Specimen: Urine, Clean Catch Updated: 07/21/25 2348     RBC, UA 0-2 /HPF      WBC, UA None Seen /HPF      Bacteria, UA 1+ /HPF      Squamous Epithelial Cells, UA 0-2 /HPF      Hyaline Casts, UA 0-2 /LPF      Methodology Manual Light Microscopy    Urinalysis With Microscopic If Indicated (No Culture) - Urine, Clean Catch [[id number]]  (Abnormal) Collected: 07/21/25 2328    Specimen: Urine, Clean Catch Updated: 07/21/25 2336     Color, UA Dark Yellow     Appearance, UA Clear     pH, UA 5.5     Specific Gravity, UA 1.024     Glucose, UA Negative     Ketones, UA Trace     Bilirubin, UA Negative     Blood, UA Trace     Protein, UA 30 mg/dL (1+)     Leuk Esterase, UA Negative     Nitrite, UA Negative     Urobilinogen, UA 1.0 E.U./dL    Comprehensive Metabolic Panel [[id number]]  (Abnormal) Collected: 07/21/25 2209    Specimen: Blood Updated: 07/21/25 2240     Glucose 125 mg/dL      BUN 15.0 mg/dL      Creatinine 1.26 mg/dL      Sodium 141 mmol/L      Potassium 4.0 mmol/L      Chloride 108 mmol/L      CO2 18.5 mmol/L      Calcium 9.2 mg/dL      Total Protein 7.2 g/dL      Albumin 4.4 g/dL      ALT (SGPT) 106 U/L      AST (SGOT) 177 U/L      Alkaline Phosphatase 99 U/L      Total Bilirubin 1.3 mg/dL      Globulin 2.8 gm/dL      A/G Ratio 1.6 g/dL      BUN/Creatinine Ratio 11.9     Anion Gap 14.5 mmol/L      eGFR 65.7 mL/min/1.73     Narrative:      GFR Categories in Chronic Kidney Disease (CKD)              GFR Category          GFR (mL/min/1.73)    Interpretation  G1                    90 or greater        Normal or high (1)  G2                     60-89                Mild decrease (1)  G3a                   45-59                Mild to moderate decrease  G3b                   30-44                Moderate to severe decrease  G4                    15-29                Severe decrease  G5                    14 or less           Kidney failure    (1)In the absence of evidence of kidney disease, neither GFR category G1 or G2 fulfill the criteria for CKD.    eGFR calculation 2021 CKD-EPI creatinine equation, which does not include race as a factor    Lipase [[id number]]  (Normal) Collected: 07/21/25 2209    Specimen: Blood Updated: 07/21/25 2240     Lipase 19 U/L     Lactic Acid, Plasma [[id number]]  (Normal) Collected: 07/21/25 2209    Specimen: Blood Updated: 07/21/25 2236     Lactate 1.5 mmol/L     [place] Draw [[id number]] Collected: 07/21/25 2209    Specimen: Blood Updated: 07/21/25 2231    Narrative:      The following orders were created for panel order [place] Draw.  Procedure                               Abnormality         Status                     ---------                               -----------         ------                     Green Top (Gel)[[id number]]                                  Final result               Lavender Top[[id number]]                                     Final result               Gold Top - SST[[id number]]                                   Final result               Light Blue Top[[id number]]                                   Final result                 Please view results for these tests on the individual orders.    Green Top (Gel) [[id number]] Collected: 07/21/25 2209    Specimen: Blood Updated: 07/21/25 2231     Extra Tube Hold for add-ons.     Comment: Auto resulted.       Lavender Top [[id number]] Collected: 07/21/25 2209    Specimen: Blood Updated: 07/21/25 2231     Extra Tube hold for add-on     Comment: Auto resulted       Gold Top - SST [[id number]] Collected: 07/21/25 2209    Specimen: Blood Updated:  07/21/25 2231     Extra Tube Hold for add-ons.     Comment: Auto resulted.       Light Blue Top [[id number]] Collected: 07/21/25 2209    Specimen: Blood Updated: 07/21/25 2231     Extra Tube Hold for add-ons.     Comment: Auto resulted       CBC & Differential [[id number]]  (Abnormal) Collected: 07/21/25 2209    Specimen: Blood Updated: 07/21/25 2221    Narrative:      The following orders were created for panel order CBC & Differential.  Procedure                               Abnormality         Status                     ---------                               -----------         ------                     CBC Auto Differential[[id number]]        Abnormal            Final result                 Please view results for these tests on the individual orders.    CBC Auto Differential [[id number]]  (Abnormal) Collected: 07/21/25 2209    Specimen: Blood Updated: 07/21/25 2221     WBC 10.60 10*3/mm3      RBC 4.41 10*6/mm3      Hemoglobin 13.4 g/dL      Hematocrit 38.2 %      MCV 86.6 fL      MCH 30.4 pg      MCHC 35.1 g/dL      RDW 12.2 %      RDW-SD 39.2 fl      MPV 9.4 fL      Platelets 247 10*3/mm3      Neutrophil % 76.8 %      Lymphocyte % 12.8 %      Monocyte % 5.5 %      Eosinophil % 4.2 %      Basophil % 0.5 %      Immature Grans % 0.2 %      Neutrophils, Absolute 8.15 10*3/mm3      Lymphocytes, Absolute 1.36 10*3/mm3      Monocytes, Absolute 0.58 10*3/mm3      Eosinophils, Absolute 0.44 10*3/mm3      Basophils, Absolute 0.05 10*3/mm3      Immature Grans, Absolute 0.02 10*3/mm3      nRBC 0.0 /100 WBC             ASSESSMENT/PLAN:      Cholelithiasis    Patient is a 59 year old male admitted to the hospital with concern for biliary disease. I did review the patient's imaging studies and although there is no wall thickening or pericholecystic fluid, based on the patient's increasing labs and physical exam, my concern for acute cholecystitis is high. I did have a detailed and extensive discussion with the patient in the  hospital today and they understand that they need to undergo robotic assisted laparoscopic cholecystectomy with ICG cholangiography or possible open cholecystectomy. Full risks and benefits of operative versus nonoperative intervention were discussed with the patient and these included things such as nonresolution of symptoms and possible worsening of symptoms without surgical intervention versus infection, bleeding, open cholecystectomy, common bile duct injury, postoperative biliary leakage, need for drain placement, need for post operative ERCP, postoperative abscess, etc with surgical intervention. The patient understands, agrees, and wishes to proceed with the surgical treatment plan as mentioned above. The patient had no questions for me at the end of the discussion.      I discussed the patients findings and my recommendations with patient.    Marcela Carpenter DO  07/22/25  09:28 EDT

## 2025-07-22 NOTE — ANESTHESIA PROCEDURE NOTES
Airway  Reason: elective    Date/Time: 7/22/2025 11:14 AM  Airway not difficult    General Information and Staff    Patient location during procedure: OR  CRNA/CAA: Keiry Veliz CRNA    Indications and Patient Condition  Indications for airway management: airway protection    Preoxygenated: yes  MILS maintained throughout    Mask difficulty assessment: 2 - vent by mask + OA or adjuvant +/- NMBA    Final Airway Details    Final airway type: endotracheal airway      Successful airway: ETT  Cuffed: yes   Successful intubation technique: direct laryngoscopy  Endotracheal tube insertion site: oral  Blade: Alexey  Blade size: 3  ETT size (mm): 7.5  Cormack-Lehane Classification: grade IIa - partial view of glottis  Placement verified by: chest auscultation and capnometry   Cuff volume (mL): 8  Measured from: teeth  ETT/EBT  to teeth (cm): 21  Number of attempts at approach: 1  Assessment: lips, teeth, and gum same as pre-op and atraumatic intubation    Additional Comments  AOI x 1 PASS, +ETCO2, +R/F/C. MOUTH TEETH GUMS SAME AS PREOP

## 2025-07-22 NOTE — CASE MANAGEMENT/SOCIAL WORK
Discharge Planning Assessment   [place]     Patient Name: Michael Paz  MRN: [id number]  Today's Date: 7/22/2025    Admit Date: 7/21/2025    Plan: Home with family   Discharge Needs Assessment       Row Name 07/22/25 0917       Living Environment    People in Home spouse    Current Living Arrangements home    Potentially Unsafe Housing Conditions none    In the past 12 months has the electric, gas, oil, or water company threatened to shut off services in your home? No    Primary Care Provided by self    Provides Primary Care For no one    Able to Return to Prior Arrangements yes       Resource/Environmental Concerns    Resource/Environmental Concerns none    Transportation Concerns none       Transportation Needs    In the past 12 months, has lack of transportation kept you from medical appointments or from getting medications? no    In the past 12 months, has lack of transportation kept you from meetings, work, or from getting things needed for daily living? No       Food Insecurity    Within the past 12 months, you worried that your food would run out before you got the money to buy more. Never true    Within the past 12 months, the food you bought just didn't last and you didn't have money to get more. Never true       Transition Planning    Patient/Family Anticipates Transition to home with family    Patient/Family Anticipated Services at Transition none    Transportation Anticipated family or friend will provide       Discharge Needs Assessment    Readmission Within the Last 30 Days no previous admission in last 30 days    Equipment Currently Used at Home none    Concerns to be Addressed no discharge needs identified    Anticipated Changes Related to Illness none    Equipment Needed After Discharge none                   Discharge Plan       Row Name 07/22/25 0918       Plan    Plan Home with family    Patient/Family in Agreement with Plan yes    Plan Comments Met with patient at bedside.Verified patient's  address, phone number, contacts, physician and pharmacy. Patient has adequate transportation. Reports no financial or food insecurity. Patient lives with his wife and 2 sons. He does not use DME and drives. He uses [facility], agreeable to meds to bed. He plans to return home at discharge, states no needs at this time. Wife at bedside, also states no additional needs.                  Continued Care and Services - Admitted Since 7/21/2025    No active coordination exists.          Demographic Summary       Row Name 07/22/25 0917       General Information    Admission Type observation    Arrived From emergency department    Required Notices Provided Observation Status Notice    Referral Source admission list    Reason for Consult discharge planning    Preferred Language English       Contact Information    Permission Granted to Share Info With                    Functional Status       Row Name 07/22/25 0917       Functional Status    Usual Activity Tolerance excellent    Current Activity Tolerance excellent       Physical Activity    On average, how many days per week do you engage in moderate to strenuous exercise (like a brisk walk)? 0 days    On average, how many minutes do you engage in exercise at this level? 0 min    Number of minutes of exercise per week 0       Assessment of Health Literacy    How often do you have someone help you read hospital materials? Never    How often do you have problems learning about your medical condition because of difficulty understanding written information? Never    How often do you have a problem understanding what is told to you about your medical condition? Never    How confident are you filling out medical forms by yourself? Extremely    Health Literacy Excellent       Functional Status, IADL    Medications independent    Meal Preparation independent    Housekeeping independent    Laundry independent    Shopping independent                   Psychosocial        Row Name 07/22/25 0917       Values/Beliefs    Spiritual, Cultural Beliefs, Caodaism Practices, Values that Affect Care no       Mental Health    Little interest or pleasure in doing things Not at all    Feeling down, depressed, or hopeless Not at all       Stress    Do you feel stress - tense, restless, nervous, or anxious, or unable to sleep at night because your mind is troubled all the time - these days? Not at all       Coping/Stress    Major Change/Loss/Stressor illness    Patient Personal Strengths able to adapt;resilient    Techniques to [place] with Loss/Stress/Change diversional activities    Reaction to Health Status accepting    Understanding of Condition and Treatment adequate understanding of medical condition;adequate understanding of treatment       Developmental Stage (Eriksson's Stages of Development)    Developmental Stage Stage 7 (35-65 years/Middle Adulthood) Generativity vs. Stagnation                   Abuse/Neglect    No documentation.                  Legal    No documentation.                  Substance Abuse    No documentation.                  Patient Forms    No documentation.                     Gene Collins RN

## 2025-07-22 NOTE — H&P
[facility]      Name:  Mihcael Paz   Age:  59 y.o.  Sex:  male  :  1965  MRN:  [id number]   Visit Number:  [id number]  Admission Date:  2025  Date Of Service:  25  Primary Care Physician:  Jenniffer Lima APRN    Chief Complaint:     Abdominal pain nausea    History Of Presenting Illness:      Patient is a 59-year-old with history of diabetes, hypertension, hyperlipidemia, bipolar disorder, who presented to the emergency room with complaints of abdominal pain.  He states it started on morning of 2025 at around 1030.  Had pain in the mid to right side of his abdomen, no radiation.  Felt nauseous.  Had eaten a salad at last meal on evening before.  Pain progressed throughout the day and thus come to the emergency room.  Denies any changes in bowel habits.  No fevers or chills.  Somewhat improved after interventions in the emergency room now.    In the ER he was overall hemodynamically stable.  His labs were showing a white count of 10 hemoglobin 13 lipase 19 lactic acid 1.5.  CMP demonstrated creatinine 1.26 with an ALT of 106  and a total bili of 1.3.  He underwent a CT scan abdomen pelvis with contrast which had demonstrated concerns of a distended gallbladder, cholelithiasis, could reflect cholecystitis.  Patient was given pain control and antiemetics.  ER provider discussed case with general surgery, Dr. Carpenter, who recommended abdominal ultrasound.  We are currently without ultrasound until the morning, patient will be admitted for observation and further testing and management.    Review Of Systems:    All systems were reviewed and negative except as mentioned in history of presenting illness, assessment and plan.    Past Medical History: Patient  has a past medical history of Acid reflux, Asthma, Depression, Diabetes mellitus, Heart murmur, Hyperlipidemia, Hypertension, Polyp of colon (), and Wears glasses.    Past  Surgical History: Patient  has a past surgical history that includes Colonoscopy (2007); Multiple tooth extractions; and Colonoscopy (N/A, 3/19/2019).    Social History: Patient  reports that he has never smoked. He has quit using smokeless tobacco. He reports that he does not drink alcohol and does not use drugs.    Family History:  Patient's family history has been reviewed and found to be noncontributory.     Allergies:      Penicillins    Home Medications:    Prior to Admission Medications       Prescriptions Last Dose Informant Patient Reported? Taking?    aspirin 81 MG EC tablet  Self Yes No    Take 81 mg by mouth daily.    atorvastatin (LIPITOR) 40 MG tablet  Self Yes No    Take 40 mg by mouth Daily.    benazepril (LOTENSIN) 40 MG tablet  Self Yes No    Take 40 mg by mouth Daily. 200001    bisacodyl (bisacodyl) 5 MG EC tablet   No No    Take 1 tablet by mouth Daily.    fexofenadine (ALLEGRA) 180 MG tablet  Self Yes No    Take 180 mg by mouth daily.    hydrochlorothiazide (HYDRODIURIL) 25 MG tablet  Self Yes No    Take 25 mg by mouth Daily.    metFORMIN (GLUCOPHAGE) 500 MG tablet  Self Yes No    Take 500 mg by mouth Daily With Breakfast.    omeprazole (PriLOSEC) 40 MG capsule  Self Yes No    Take 40 mg by mouth Daily.    potassium chloride (K-DUR) 10 MEQ CR tablet   No No    Take 2 tablets by mouth Daily.    sertraline (ZOLOFT) 100 MG tablet  Self Yes No    Take 150 mg by mouth Daily.    VENTOLIN  (90 BASE) MCG/ACT inhaler  Self Yes No    Inhale 2 puffs Every 4 (Four) Hours As Needed for Wheezing.          ED Medications:    Medications   sodium chloride 0.9 % flush 10 mL (has no administration in time range)   morphine injection 4 mg (has no administration in time range)   ondansetron (ZOFRAN) injection 4 mg (has no administration in time range)   iopamidol (ISOVUE-300) 61 % injection 100 mL (100 mL Intravenous Given 7/22/25 0009)     Vital Signs:  Temp:  [97.5 °F (36.4 °C)] 97.5 °F (36.4 °C)  Heart  "Rate:  [73] 73  Resp:  [20] 20  BP: (151-159)/() 151/100        07/21/25 2156   Weight: 79.8 kg (176 lb)     Body mass index is 28.41 kg/m².    Physical Exam:     Most recent vital Signs: /100   Pulse 73   Temp 97.5 °F (36.4 °C) (Oral)   Resp 20   Ht 167.6 cm (66\")   Wt 79.8 kg (176 lb)   SpO2 96%   BMI 28.41 kg/m²     Physical Exam  Constitutional:       General: He is not in acute distress.     Appearance: He is not toxic-appearing.   HENT:      Mouth/Throat:      Mouth: Mucous membranes are dry.      Pharynx: Oropharynx is clear.   Cardiovascular:      Rate and Rhythm: Normal rate and regular rhythm.      Pulses: Normal pulses.      Heart sounds: No murmur heard.     No gallop.   Pulmonary:      Effort: No respiratory distress.      Breath sounds: No wheezing or rales.   Abdominal:      Tenderness: There is abdominal tenderness. There is no guarding or rebound.   Musculoskeletal:         General: Normal range of motion.      Right lower leg: No edema.      Left lower leg: No edema.   Skin:     General: Skin is warm.      Capillary Refill: Capillary refill takes less than 2 seconds.   Neurological:      General: No focal deficit present.      Mental Status: He is alert.      Motor: No weakness.   Psychiatric:         Mood and Affect: Mood normal.         Thought Content: Thought content normal.         Laboratory data:    I have reviewed the labs done in the emergency room.    Results from last 7 days   Lab Units 07/21/25 2209   SODIUM mmol/L 141   POTASSIUM mmol/L 4.0   CHLORIDE mmol/L 108*   CO2 mmol/L 18.5*   BUN mg/dL 15.0   CREATININE mg/dL 1.26   CALCIUM mg/dL 9.2   BILIRUBIN mg/dL 1.3*   ALK PHOS U/L 99   ALT (SGPT) U/L 106*   AST (SGOT) U/L 177*   GLUCOSE mg/dL 125*     Results from last 7 days   Lab Units 07/21/25 2209   WBC 10*3/mm3 10.60   HEMOGLOBIN g/dL 13.4   HEMATOCRIT % 38.2   PLATELETS 10*3/mm3 247                     Results from last 7 days   Lab Units 07/21/25 2209 "   LIPASE U/L 19         Results from last 7 days   Lab Units 07/21/25  2328   COLOR UA  Dark Yellow*   GLUCOSE UA  Negative   KETONES UA  Trace*   BLOOD UA  Trace*   LEUKOCYTES UA  Negative   PH, URINE  5.5   BILIRUBIN UA  Negative   UROBILINOGEN UA  1.0 E.U./dL   RBC UA /HPF 0-2   WBC UA /HPF None Seen       Pain Management Panel           No data to display                EKG:          Radiology:    CT Abdomen Pelvis With Contrast  Result Date: 7/22/2025  FINAL REPORT TECHNIQUE: null CLINICAL HISTORY: Abdominal pain COMPARISON: null FINDINGS: CT abdomen and pelvis with IV contrast. Comparison: None FINDINGS: Lung bases: No soft tissue nodule, infiltrate, or pleural effusion. Upper Abdomen: Mild hepatosplenomegaly without discrete lesion. Moderate distention of the gallbladder without radiopaque/radiolucent calculus. Normal caliber CBD. Unremarkable pancreas. Contrast opacified portal, superior mesenteric, splenic, and hepatic veins without filling defects. Retroperitoneum/Pelvis: No adrenal mass. Abdominal aorta is of normal caliber with moderate calcific atheromatous change. No pathologic para-aortic adenopathy. Kidneys without solid mass or hydronephrosis. No kidney stones. Upper left kidney 2.6 x 2.0 cm cortical cysts. Nondilated ureters. Nondistended urinary bladder. Small volume water density free fluid in the dependent pelvis. Bowel/Mesentery: There is normal stool volume within the colon. No evidence of mass/obstruction. No diverticular disease or inflammation. Appendix is normal. Small bowel loops are nondilated, without evidence of mural thickening. No mesenteric mass or pathologic adenopathy. Patent superior mesenteric artery and vein. No focal gastric abnormality. No free air or fluid. Bone/Extraperitoneal Soft Tissues: Bilateral L5 pars defects without listhesis. Multilevel mild degenerative disc and facet disease with mild apex right scoliosis. Unremarkable bony pelvis. No ventral wall hernia.      IMPRESSION: 1. No bowel obstruction or inflammation, free air or fluid. 2. Distended gallbladder may reflect acute cholecystitis, noting that about 25% of gallstones are isodense on CT scan. 3. Mild hepatosplenomegaly. 4. Other findings above. Authenticated and Electronically Signed by Bronson Rubin MD on 07/22/2025 12:52:37 AM      Assessment:    Concern for cholecystitis/symptomatic cholelithiasis, POA  Mild transaminitis  History of hypertension  History of bipolar disorder  Renal cyst    Plan:    Cholecystitis/cholelithiasis/abdominal pain  Patient does have symptomatology concerning for biliary disease.  Will give IV fluid resuscitation, empiric antibiotics with Rocephin and Flagyl.  Will keep n.p.o. and plan on gallbladder ultrasound.  Will have general surgery consultation later today.  As needed pain control ordered.    Further recommendations are predicated upon improvement in clinical condition.  Plan of care discussed with the patient and spouse    Risk Assessment: Moderate  DVT Prophylaxis: SCDs  Code Status: Full  Diet: N.p.o.    Advance Care Planning   ACP discussion was held with the patient during this visit. Patient does not have an advance directive, declines further assistance.           Kajal Mena DO  07/22/25  01:30 EDT    Dictated utilizing Dragon dictation.

## 2025-07-22 NOTE — PLAN OF CARE
Goal Outcome Evaluation:  Plan of Care Reviewed With: patient        Progress: improving        New admit from the ER. Patient has being stable with no significant changes except for some episodes of nausea and vomiting this morning, PRN Zofran given. VSS on room air. Scheduled abdominal Ultrasound this morning. NPO at this time. Plan of care ongoing.

## 2025-07-22 NOTE — ANESTHESIA PREPROCEDURE EVALUATION
Anesthesia Evaluation     Patient summary reviewed and Nursing notes reviewed   no history of anesthetic complications:   NPO Solid Status: > 8 hours  NPO Liquid Status: > 8 hours           Airway   Mallampati: II  TM distance: >3 FB  Neck ROM: full  Possible difficult intubation  Dental - normal exam     Pulmonary - normal exam   (+) asthma,  Cardiovascular - normal exam    Rhythm: regular  Rate: normal    (+) hypertension, valvular problems/murmurs murmur, hyperlipidemia      Neuro/Psych  (+) psychiatric history Depression and Bipolar  GI/Hepatic/Renal/Endo    (+) GERD, diabetes mellitus    Musculoskeletal     Abdominal  - normal exam    Abdomen: soft.  Bowel sounds: normal.   Substance History   (+) drug use (THC)     OB/GYN          Other        ROS/Med Hx Other: Labs reviewed       Assessment:     1. Concern for cholecystitis/symptomatic cholelithiasis, POA  2. Mild transaminitis  3. History of hypertension  4. History of bipolar disorder  5. Renal cyst                  Anesthesia Plan    ASA 2 - emergent     general     (Risks and benefits discussed including risk of aspiration, recall and dental damage. All patient questions answered. Will continue with POC. )  intravenous induction     Anesthetic plan, risks, benefits, and alternatives have been provided, discussed and informed consent has been obtained with: patient.  Pre-procedure education provided    CODE STATUS:    Code Status (Patient has no pulse and is not breathing): CPR (Attempt to Resuscitate)  Medical Interventions (Patient has pulse or is breathing): Full Support

## 2025-07-23 VITALS
RESPIRATION RATE: 17 BRPM | HEIGHT: 67 IN | BODY MASS INDEX: 26.3 KG/M2 | OXYGEN SATURATION: 98 % | DIASTOLIC BLOOD PRESSURE: 95 MMHG | SYSTOLIC BLOOD PRESSURE: 162 MMHG | TEMPERATURE: 98.3 F | HEART RATE: 77 BPM | WEIGHT: 167.55 LBS

## 2025-07-23 PROBLEM — K80.20 CHOLELITHIASIS: Status: RESOLVED | Noted: 2025-07-22 | Resolved: 2025-07-23

## 2025-07-23 LAB
ALBUMIN SERPL-MCNC: 4.1 G/DL (ref 3.5–5.2)
ALBUMIN/GLOB SERPL: 1.5 G/DL
ALP SERPL-CCNC: 85 U/L (ref 39–117)
ALT SERPL W P-5'-P-CCNC: 88 U/L (ref 1–41)
ANION GAP SERPL CALCULATED.3IONS-SCNC: 13.7 MMOL/L (ref 5–15)
AST SERPL-CCNC: 57 U/L (ref 1–40)
BASOPHILS # BLD AUTO: 0.02 10*3/MM3 (ref 0–0.2)
BASOPHILS NFR BLD AUTO: 0.2 % (ref 0–1.5)
BILIRUB SERPL-MCNC: 0.4 MG/DL (ref 0–1.2)
BUN SERPL-MCNC: 17 MG/DL (ref 6–20)
BUN/CREAT SERPL: 16 (ref 7–25)
CALCIUM SPEC-SCNC: 8.9 MG/DL (ref 8.6–10.5)
CHLORIDE SERPL-SCNC: 100 MMOL/L (ref 98–107)
CO2 SERPL-SCNC: 24.3 MMOL/L (ref 22–29)
CREAT SERPL-MCNC: 1.06 MG/DL (ref 0.76–1.27)
DEPRECATED RDW RBC AUTO: 41.3 FL (ref 37–54)
EGFRCR SERPLBLD CKD-EPI 2021: 80.8 ML/MIN/1.73
EOSINOPHIL # BLD AUTO: 0.01 10*3/MM3 (ref 0–0.4)
EOSINOPHIL NFR BLD AUTO: 0.1 % (ref 0.3–6.2)
ERYTHROCYTE [DISTWIDTH] IN BLOOD BY AUTOMATED COUNT: 12.8 % (ref 12.3–15.4)
GLOBULIN UR ELPH-MCNC: 2.8 GM/DL
GLUCOSE SERPL-MCNC: 177 MG/DL (ref 65–99)
HCT VFR BLD AUTO: 34.2 % (ref 37.5–51)
HGB BLD-MCNC: 11.7 G/DL (ref 13–17.7)
IMM GRANULOCYTES # BLD AUTO: 0.05 10*3/MM3 (ref 0–0.05)
IMM GRANULOCYTES NFR BLD AUTO: 0.4 % (ref 0–0.5)
LYMPHOCYTES # BLD AUTO: 0.88 10*3/MM3 (ref 0.7–3.1)
LYMPHOCYTES NFR BLD AUTO: 6.9 % (ref 19.6–45.3)
MCH RBC QN AUTO: 30.2 PG (ref 26.6–33)
MCHC RBC AUTO-ENTMCNC: 34.2 G/DL (ref 31.5–35.7)
MCV RBC AUTO: 88.1 FL (ref 79–97)
MONOCYTES # BLD AUTO: 0.77 10*3/MM3 (ref 0.1–0.9)
MONOCYTES NFR BLD AUTO: 6.1 % (ref 5–12)
NEUTROPHILS NFR BLD AUTO: 10.94 10*3/MM3 (ref 1.7–7)
NEUTROPHILS NFR BLD AUTO: 86.3 % (ref 42.7–76)
NRBC BLD AUTO-RTO: 0 /100 WBC (ref 0–0.2)
PLATELET # BLD AUTO: 175 10*3/MM3 (ref 140–450)
PMV BLD AUTO: 10.3 FL (ref 6–12)
POTASSIUM SERPL-SCNC: 3.8 MMOL/L (ref 3.5–5.2)
PROT SERPL-MCNC: 6.9 G/DL (ref 6–8.5)
RBC # BLD AUTO: 3.88 10*6/MM3 (ref 4.14–5.8)
SODIUM SERPL-SCNC: 138 MMOL/L (ref 136–145)
WBC NRBC COR # BLD AUTO: 12.67 10*3/MM3 (ref 3.4–10.8)

## 2025-07-23 PROCEDURE — 25010000002 METRONIDAZOLE 500 MG/100ML SOLUTION: Performed by: STUDENT IN AN ORGANIZED HEALTH CARE EDUCATION/TRAINING PROGRAM

## 2025-07-23 PROCEDURE — 85025 COMPLETE CBC W/AUTO DIFF WBC: CPT | Performed by: STUDENT IN AN ORGANIZED HEALTH CARE EDUCATION/TRAINING PROGRAM

## 2025-07-23 PROCEDURE — 80053 COMPREHEN METABOLIC PANEL: CPT | Performed by: STUDENT IN AN ORGANIZED HEALTH CARE EDUCATION/TRAINING PROGRAM

## 2025-07-23 PROCEDURE — 99239 HOSP IP/OBS DSCHRG MGMT >30: CPT | Performed by: STUDENT IN AN ORGANIZED HEALTH CARE EDUCATION/TRAINING PROGRAM

## 2025-07-23 PROCEDURE — 99024 POSTOP FOLLOW-UP VISIT: CPT | Performed by: STUDENT IN AN ORGANIZED HEALTH CARE EDUCATION/TRAINING PROGRAM

## 2025-07-23 PROCEDURE — 25010000002 CEFTRIAXONE PER 250 MG: Performed by: STUDENT IN AN ORGANIZED HEALTH CARE EDUCATION/TRAINING PROGRAM

## 2025-07-23 PROCEDURE — G0378 HOSPITAL OBSERVATION PER HR: HCPCS

## 2025-07-23 RX ORDER — ALBUTEROL SULFATE 0.83 MG/ML
2.5 SOLUTION RESPIRATORY (INHALATION) EVERY 6 HOURS PRN
Status: DISCONTINUED | OUTPATIENT
Start: 2025-07-23 | End: 2025-07-23 | Stop reason: HOSPADM

## 2025-07-23 RX ORDER — HYDROCODONE BITARTRATE AND ACETAMINOPHEN 5; 325 MG/1; MG/1
1 TABLET ORAL EVERY 6 HOURS PRN
Qty: 10 TABLET | Refills: 0 | Status: SHIPPED | OUTPATIENT
Start: 2025-07-23

## 2025-07-23 RX ADMIN — METRONIDAZOLE 500 MG: 500 INJECTION, SOLUTION INTRAVENOUS at 09:54

## 2025-07-23 RX ADMIN — Medication 10 ML: at 09:55

## 2025-07-23 RX ADMIN — HYDROCODONE BITARTRATE AND ACETAMINOPHEN 1 TABLET: 5; 325 TABLET ORAL at 07:49

## 2025-07-23 RX ADMIN — METRONIDAZOLE 500 MG: 500 INJECTION, SOLUTION INTRAVENOUS at 02:02

## 2025-07-23 RX ADMIN — LAMOTRIGINE 150 MG: 100 TABLET ORAL at 09:54

## 2025-07-23 RX ADMIN — SERTRALINE 150 MG: 50 TABLET, FILM COATED ORAL at 09:55

## 2025-07-23 RX ADMIN — CEFTRIAXONE SODIUM 1000 MG: 1 INJECTION, POWDER, FOR SOLUTION INTRAMUSCULAR; INTRAVENOUS at 02:01

## 2025-07-23 NOTE — PLAN OF CARE
Goal Outcome Evaluation:  Plan of Care Reviewed With: patient        Progress: improving          Patient has being stable throughout shift after cholecystectomy. PRN pain medication given for pain management. Four glued incisions to abdomen, clean dry and intact with no drainage. Patient continue with IV antibiotics. VSS on room air. Plan of care ongoing.

## 2025-07-23 NOTE — CASE MANAGEMENT/SOCIAL WORK
Continued Stay Note   [place]     Patient Name: Michael Paz  MRN: [id number]  Today's Date: 7/23/2025    Admit Date: 7/21/2025    Plan: Patient plans to return home with wife at DC; no needs at this time.   Discharge Plan       Row Name 07/23/25 1032       Plan    Plan Patient plans to return home with wife at DC; no needs at this time.                   Discharge Codes    No documentation.                       Katey Jarquin RN     DISCHARGE

## 2025-07-23 NOTE — PLAN OF CARE
Goal Outcome Evaluation:      VSS throughout shift. Complaining of some abdominal pain that is relieved by PRN medication. He is passing gas and ambulating without difficulty. Being discharged home today and is to follow up with general surgery in 1 week.

## 2025-07-23 NOTE — PROGRESS NOTES
LOS: 0 days   Patient Care Team:  Jenniffer Lima APRN as PCP - General (Family Medicine)  Pepe Alfaro MD as Consulting Physician (General Surgery)       Chief Complaint: POD1 robotic assisted cholecystectomy    HPI: Patient seen and examined at bedside. No acute events overnight. He states his abdomen is sore, but he feels much improved from yesterday. He states he is passing flatus and has not had a BM yet. Patient has not had anything to eat here because he eats only organic foods, but denies nausea or vomiting.     Vital Signs  Temp:  [97.3 °F (36.3 °C)-99.6 °F (37.6 °C)] 98.3 °F (36.8 °C)  Heart Rate:  [77-99] 77  Resp:  [15-20] 17  BP: (100-162)/() 162/95    Physical Exam:     General Appearance:  Alert and cooperative, not in any acute distress.   Respiratory/Lungs:   Breath sounds heard bilaterally equally.  No crackles or wheezing. No Pleural rub or bronchial breathing. Normal respiratory effort.    Cardiovascular/Heart:  Normal S1 and S2, no murmur. No edema   GI/Abdomen:   Soft, appropriately tender to palpation, non-distended, incisions are clean, dry, and intact                 Musculoskeletal/ Extremities:   Moves all extremities well   Skin: No bleeding, bruising or rash, no induration   Psychiatric : Alert and oriented ×3.  No depression or anxiety    Neurologic: Cranial nerves 2 - 12 grossly intact, sensation intact, Motor power is normal and equal bilaterally.     Results Review:       Lab Results (last 24 hours)       Procedure Component Value Units Date/Time    Comprehensive Metabolic Panel [[id number]]  (Abnormal) Collected: 07/23/25 0754    Specimen: Blood Updated: 07/23/25 0938     Glucose 177 mg/dL      BUN 17.0 mg/dL      Creatinine 1.06 mg/dL      Sodium 138 mmol/L      Potassium 3.8 mmol/L      Chloride 100 mmol/L      CO2 24.3 mmol/L      Calcium 8.9 mg/dL      Total Protein 6.9 g/dL      Albumin 4.1 g/dL      ALT (SGPT) 88 U/L      AST (SGOT) 57 U/L      Alkaline  Phosphatase 85 U/L      Total Bilirubin 0.4 mg/dL      Globulin 2.8 gm/dL      A/G Ratio 1.5 g/dL      BUN/Creatinine Ratio 16.0     Anion Gap 13.7 mmol/L      eGFR 80.8 mL/min/1.73     Narrative:      GFR Categories in Chronic Kidney Disease (CKD)              GFR Category          GFR (mL/min/1.73)    Interpretation  G1                    90 or greater        Normal or high (1)  G2                    60-89                Mild decrease (1)  G3a                   45-59                Mild to moderate decrease  G3b                   30-44                Moderate to severe decrease  G4                    15-29                Severe decrease  G5                    14 or less           Kidney failure    (1)In the absence of evidence of kidney disease, neither GFR category G1 or G2 fulfill the criteria for CKD.    eGFR calculation 2021 CKD-EPI creatinine equation, which does not include race as a factor    CBC & Differential [[id number]]  (Abnormal) Collected: 07/23/25 0754    Specimen: Blood Updated: 07/23/25 0914    Narrative:      The following orders were created for panel order CBC & Differential.  Procedure                               Abnormality         Status                     ---------                               -----------         ------                     CBC Auto Differential[[id number]]        Abnormal            Final result                 Please view results for these tests on the individual orders.    CBC Auto Differential [[id number]]  (Abnormal) Collected: 07/23/25 0754    Specimen: Blood Updated: 07/23/25 0914     WBC 12.67 10*3/mm3      RBC 3.88 10*6/mm3      Hemoglobin 11.7 g/dL      Hematocrit 34.2 %      MCV 88.1 fL      MCH 30.2 pg      MCHC 34.2 g/dL      RDW 12.8 %      RDW-SD 41.3 fl      MPV 10.3 fL      Platelets 175 10*3/mm3      Neutrophil % 86.3 %      Lymphocyte % 6.9 %      Monocyte % 6.1 %      Eosinophil % 0.1 %      Basophil % 0.2 %      Immature Grans % 0.4 %      Neutrophils,  Absolute 10.94 10*3/mm3      Lymphocytes, Absolute 0.88 10*3/mm3      Monocytes, Absolute 0.77 10*3/mm3      Eosinophils, Absolute 0.01 10*3/mm3      Basophils, Absolute 0.02 10*3/mm3      Immature Grans, Absolute 0.05 10*3/mm3      nRBC 0.0 /100 WBC     TISSUE EXAM, [facility] ([facility]) [[id number]] Collected: 07/22/25 1137    Specimen: Tissue from Gallbladder Updated: 07/23/25 0849                Assessment & Plan       Cholelithiasis    Patient is a 59 year old male POD1 robotic assisted laparoscopic cholecystectomy for acute on chronic cholecystitis. He feels much improved today. Denies nausea or vomiting. Vital signs have been stable and his laboratory studies have improved today with the exception of leukocytosis of 12 which is likely reactive to surgery. Patient is stable for discharge home from my standpoint. He should avoid lifting greater than 10-15 lbs for 4-6 weeks after surgery. I have provided him with a prescription for Norco. Patient will follow up with me in office in 7-10 days. He expresses understanding of the plan and all of his questions were answered.     Marcela Carpenter DO  07/23/25  11:28 EDT

## 2025-07-23 NOTE — CASE MANAGEMENT/SOCIAL WORK
Case Management Discharge Note      Final Note: Patient has been medically cleared for DC.  He plans to return home with his wife via personal vehicle.  No needs voiced at this time.         Selected Continued Care - Admitted Since 7/21/2025       Destination    No services have been selected for the patient.                Durable Medical Equipment    No services have been selected for the patient.                Dialysis/Infusion    No services have been selected for the patient.                Home Medical Care    No services have been selected for the patient.                Therapy    No services have been selected for the patient.                Community Resources    No services have been selected for the patient.                Community & DME    No services have been selected for the patient.                    Transportation Services  Transportation: Private Transportation  Private: Car    Final Discharge Disposition Code: 01 - home or self-care

## 2025-07-23 NOTE — DISCHARGE INSTR - ACTIVITY
No heavy lifting anything over 10lbs for 6-8 weeks.     You are able to shower and wash over incision sites. Glue will come off on its own. Please do not submerge in water (baths, pools, hot tubs, lake, etc.) for at least 2 weeks post procedure.

## 2025-07-23 NOTE — ANESTHESIA POSTPROCEDURE EVALUATION
Patient: Michael Paz    Procedure Summary       Date: 07/22/25 Room / Location: [facility] OR 2 /  [facility] OR    Anesthesia Start: 1102 Anesthesia Stop: 1256    Procedure: CHOLECYSTECTOMY LAPAROSCOPIC WITH DAVINCI ROBOT (Abdomen) Diagnosis:       Calculus of gallbladder with acute cholecystitis without obstruction      (Calculus of gallbladder with acute cholecystitis without obstruction [K80.00])    Surgeons: Marcela Carpenter DO Provider: Keiry Veliz CRNA    Anesthesia Type: general ASA Status: 2 - Emergent            Anesthesia Type: general    Vitals  Vitals Value Taken Time   /82 07/22/25 14:00   Temp 97.7 °F (36.5 °C) 07/22/25 13:50   Pulse 86 07/22/25 14:01   Resp 18 07/22/25 14:00   SpO2 92 % 07/22/25 14:01   Vitals shown include unfiled device data.        Post Anesthesia Care and Evaluation    Patient location during evaluation: PACU  Patient participation: complete - patient participated  Level of consciousness: sleepy but conscious  Pain score: 0  Pain management: satisfactory to patient    Airway patency: patent  Anesthetic complications: No anesthetic complications  PONV Status: none  Cardiovascular status: acceptable and stable  Respiratory status: acceptable  Hydration status: acceptable    Comments: Vitals signs as noted in nursing documentation as per protocol.

## 2025-07-23 NOTE — DISCHARGE SUMMARY
[facility]   DISCHARGE SUMMARY      Name:  Michael Paz   Age:  59 y.o.  Sex:  male  :  1965  MRN:  [id number]   Visit Number:  [id number]    Admission Date:  2025  Date of Discharge:  2025  Primary Care Physician:  Jenniffer Lima APRN      Problem List:     Active Hospital Problems   No active problems to display.      Resolved Hospital Problems    Diagnosis Date Resolved POA    **Cholelithiasis [K80.20] 2025 Yes     Presenting Problem:    Chief Complaint   Patient presents with    Abdominal Pain      Consults:     Consulting Physician(s)         Provider   Role Specialty     Marcela Carpenter DO      Consulting Physician General Surgery          Procedures Performed:    Procedure(s):  CHOLECYSTECTOMY LAPAROSCOPIC WITH DAVINCI ROBOT    History of presenting illness/Hospital Course:    Michael Paz is a 59-year-old with history of diabetes, hypertension, hyperlipidemia, bipolar disorder, who presented to the emergency room with complaints of abdominal pain.  He states it started on morning of 2025 at around 1030.  Had pain in the mid to right side of his abdomen, no radiation.  Felt nauseous.  Had eaten a salad at last meal on evening before.  Pain progressed throughout the day and thus come to the emergency room.  Denies any changes in bowel habits.  No fevers or chills.  Somewhat improved after interventions in the emergency room now.     In the ER he was overall hemodynamically stable.  His labs were showing a white count of 10 hemoglobin 13 lipase 19 lactic acid 1.5.  CMP demonstrated creatinine 1.26 with an ALT of 106  and a total bili of 1.3.  He underwent a CT scan abdomen pelvis with contrast which had demonstrated concerns of a distended gallbladder, cholelithiasis, could reflect cholecystitis.  Patient was given pain control and antiemetics.  ER provider discussed case with general surgery, Dr. Carpenter, who recommended abdominal  ultrasound.      Observation General Floor admission early a.m. 7/22/2025 with cholecystitis.  Status post ORIF on 7/22/2025.  Clinically did well during course.    Able for discharge home 7/23/2025.     Cholecystitis with cholelithiasis, resolved  Follow-up outpatient with general surgery.  During course received Rocephin and Flagyl.  IVF.  OR 7/22.  [facility]  Improved with IVF.     Chronic: diabetes, hypertension, hyperlipidemia, bipolar disorder  Continue medications.    Vital Signs:    Temp:  [97.3 °F (36.3 °C)-99.6 °F (37.6 °C)] 98.3 °F (36.8 °C)  Heart Rate:  [77-99] 77  Resp:  [15-20] 17  BP: (100-162)/() 162/95    Physical Exam:    General Appearance:  Alert and cooperative.    Head:  Atraumatic and normocephalic.   Eyes: Conjunctivae and sclerae normal, no icterus. No pallor.   Ears:  Ears with no abnormalities noted.   Throat: No oral lesions, no thrush, oral mucosa moist.   Neck: Supple, trachea midline, no thyromegaly.   Back:   No kyphoscoliosis present. No tenderness to palpation.   Lungs:   Breath sounds heard bilaterally equally.  No crackles or wheezing. No Pleural rub or bronchial breathing.   Heart:  Normal S1 and S2, no murmur, no gallop, no rub. No JVD.   Abdomen:   Normal bowel sounds, no masses, no organomegaly. Soft, mild tenderness, nondistended, no rebound tenderness.   Extremities: Supple, no edema, no cyanosis, no clubbing.   Pulses: Pulses palpable bilaterally.   Skin: Warm.   Neurologic: Alert and oriented x 3. No facial asymmetry. Moves all four limbs. No tremors.     Pertinent Lab Results:     Results from last 7 days   Lab Units 07/23/25  0754 07/22/25  0559 07/21/25  2209   SODIUM mmol/L 138 142 141   POTASSIUM mmol/L 3.8 3.8 4.0   CHLORIDE mmol/L 100 107 108*   CO2 mmol/L 24.3 22.7 18.5*   BUN mg/dL 17.0 15.0 15.0   CREATININE mg/dL 1.06 1.34* 1.26   CALCIUM mg/dL 8.9 8.9 9.2   BILIRUBIN mg/dL 0.4 0.6 1.3*   ALK PHOS U/L 85 109 99   ALT (SGPT) U/L 88* 174* 106*   AST (SGOT) U/L  57* 190* 177*   GLUCOSE mg/dL 177* 121* 125*     Results from last 7 days   Lab Units 07/23/25  0754 07/22/25  0559 07/21/25  2209   WBC 10*3/mm3 12.67* 11.25* 10.60   HEMOGLOBIN g/dL 11.7* 12.8* 13.4   HEMATOCRIT % 34.2* 36.6* 38.2   PLATELETS 10*3/mm3 175 225 247                     Results from last 7 days   Lab Units 07/21/25  2209   LIPASE U/L 19               Pertinent Radiology Results:    Imaging Results (All)       Procedure Component Value Units Date/Time    XR Chest AP [[id number]] Collected: 07/22/25 1139     Updated: 07/22/25 1146    Narrative:      PROCEDURE: XR CHEST AP-     HISTORY: Asthma/Febrile; K80.00-Calculus of gallbladder with acute  cholecystitis without obstruction     COMPARISON: 1/10/2020.     FINDINGS: The heart is normal in size. The mediastinum is unremarkable.  The lungs are clear. There is no pneumothorax.  There are no acute  osseous abnormalities. Apical lordotic positioning.       Impression:      No acute cardiopulmonary process.                    Images were reviewed, interpreted, and dictated by Dr. Suma Quiroz MD  Transcribed by Marco Fletcher PA-C.     This report was signed and finalized on 7/22/2025 11:43 AM by Suma Quiroz MD.       US Gallbladder [[id number]] Collected: 07/22/25 0918     Updated: 07/22/25 0922    Narrative:      RIGHT UPPER QUADRANT ULTRASOUND     HISTORY: concern cholecystitis     COMPARISON: None.     PROCEDURE: Ultrasound images of the right upper quadrant were obtained.     FINDINGS:  The liver parenchyma is of proper echogenicity.  There is no  focal abnormality. The gallbladder is distended as seen on CT performed  earlier the same day. There are  no gallstones. There is a nondependent,  echogenic 3 mm structure within the gallbladder that does not  demonstrate internal flow. Consider small polyp versus adherent sludge  ball. No evidence of ductal dilatation is identified. Limited images of  the pancreas are  obscured by bowel gas. [  ]        Impression:      Distended gallbladder with no wall thickening or  pericholecystic fluid that contains a 3 mm polyp versus adherent sludge  ball.           This report was signed and finalized on 7/22/2025 9:20 AM by Suma Quiroz MD.       CT Abdomen Pelvis With Contrast [[id number]] Collected: 07/22/25 0052     Updated: 07/22/25 0054    Narrative:      FINAL REPORT    TECHNIQUE:  null    CLINICAL HISTORY:  Abdominal pain    COMPARISON:  null    FINDINGS:  CT abdomen and pelvis with IV contrast.    Comparison: None    FINDINGS:    Lung bases:    No soft tissue nodule, infiltrate, or pleural effusion.    Upper Abdomen:    Mild hepatosplenomegaly without discrete lesion.    Moderate distention of the gallbladder without radiopaque/radiolucent calculus. Normal caliber CBD.    Unremarkable pancreas.    Contrast opacified portal, superior mesenteric, splenic, and hepatic veins without filling defects.    Retroperitoneum/Pelvis:    No adrenal mass.    Abdominal aorta is of normal caliber with moderate calcific atheromatous change.    No pathologic para-aortic adenopathy.    Kidneys without solid mass or hydronephrosis. No kidney stones. Upper left kidney 2.6 x 2.0 cm cortical cysts.    Nondilated ureters. Nondistended urinary bladder.    Small volume water density free fluid in the dependent pelvis.    Bowel/Mesentery:    There is normal stool volume within the colon. No evidence of mass/obstruction. No diverticular disease or inflammation.    Appendix is normal.    Small bowel loops are nondilated, without evidence of mural thickening.    No mesenteric mass or pathologic adenopathy. Patent superior mesenteric artery and vein.    No focal gastric abnormality.    No free air or fluid.    Bone/Extraperitoneal Soft Tissues:    Bilateral L5 pars defects without listhesis.    Multilevel mild degenerative disc and facet disease with mild apex right scoliosis.    Unremarkable bony pelvis.    No ventral wall hernia.       Impression:      IMPRESSION:    1. No bowel obstruction or inflammation, free air or fluid.    2. Distended gallbladder may reflect acute cholecystitis, noting that about 25% of gallstones are isodense on CT scan.    3. Mild hepatosplenomegaly.    4. Other findings above.    Authenticated and Electronically Signed by Bronson Rubin MD on  07/22/2025 12:52:37 AM            Echo:      Condition on Discharge:      Stable.    Code status during the hospital stay:    Code Status and Medical Interventions: CPR (Attempt to Resuscitate); Full Support   Ordered at: 07/22/25 0216     Code Status (Patient has no pulse and is not breathing):    CPR (Attempt to Resuscitate)     Medical Interventions (Patient has pulse or is breathing):    Full Support     Discharge Disposition:    Home or Self Care    Discharge Medications:       Discharge Medications        New Medications        Instructions Start Date   HYDROcodone-acetaminophen 5-325 MG per tablet  Commonly known as: NORCO   1 tablet, Oral, Every 6 Hours PRN             Continue These Medications        Instructions Start Date   aspirin 81 MG EC tablet   81 mg, Daily      atorvastatin 40 MG tablet  Commonly known as: LIPITOR   40 mg, Daily      benazepril 40 MG tablet  Commonly known as: LOTENSIN   40 mg, Daily      fexofenadine 180 MG tablet  Commonly known as: ALLEGRA   180 mg, Daily      lamoTRIgine 100 MG tablet  Commonly known as: LaMICtal   150 mg, Daily      metFORMIN 500 MG tablet  Commonly known as: GLUCOPHAGE   500 mg, Daily With Breakfast      omeprazole 40 MG capsule  Commonly known as: priLOSEC   40 mg, Daily      sertraline 100 MG tablet  Commonly known as: ZOLOFT   150 mg, Daily      Ventolin  (90 Base) MCG/ACT inhaler  Generic drug: albuterol sulfate HFA   2 puffs, Every 4 Hours PRN             Discharge Diet:     Diet Instructions       Advance Diet As Tolerated -Target Diet: Per general surgery, advance to cardiac diet      Target Diet: Per general  surgery, advance to cardiac diet          Activity at Discharge:     Activity Instructions       Activity as Tolerated     Additional Activity Instructions:    No heavy lifting anything over 10lbs for 6-8 weeks.     You are able to shower and wash over incision sites. Glue will come off on its own. Please do not submerge in water (baths, pools, hot tubs, lake, etc.) for at least 2 weeks post procedure.          Follow-up Appointments:     Follow-up Information       Marcela Carpenter DO Follow up in 7 day(s).    Specialty: General Surgery  Why: Tuesday July 29th at 9:30 am with Dr. Carpenter.  Contact information:  [address] [facility]  [address]  [facility] [address]  [phone]               Jenniffer Lima APRN .    Specialty: Family Medicine  Why: Patient's wife going to make appointment.  Contact information:  [address] [place] [address]  [address]  [facility] [address]  [phone]                           Future Appointments   Date Time Provider Department Center   7/29/2025  9:30 AM Marcela Carpenter DO [facility]  [facility]     Test Results Pending at Discharge:    Pending Results       Procedure [Order ID] Specimen - Date/Time    TISSUE EXAM, [facility] ([facility]) [[id number]] Collected: 07/22/25 1137    Specimen: Tissue from Gallbladder Updated: 07/23/25 0849                 Brian Joseph Kerley, DO  07/23/25  12:39 EDT    Time: I spent 35 minutes on this discharge activity which included: face-to-face encounter with the patient, reviewing the data in the system, coordination of the care with the nursing staff as well as consultants, documentation, and entering orders.     Dictated utilizing Dragon dictation.    I have reviewed the copied text and it is accurate as of 7/23/2025

## 2025-07-24 LAB — REF LAB TEST METHOD: NORMAL

## 2025-07-24 NOTE — ED PROVIDER NOTES
Pt Name: Michael Paz  MRN: [id number]  Pt :   1965  Room Number:  T3D/1  Date of encounter:  2025  PCP: Jenniffer Lima APRN  ED Provider: JULY Beasley    Historian: Patient    Subjective    History of Present Illness:    Patient's care was started while patient was in the lobby due to high ER volumes.  Initial assessment was obtained, labs, radiographic orders were placed and patient will be reevaluated once placed in appropriate setting.    HPI:    Chief Complaint   Patient presents with    Abdominal Pain        History of Present Illness: Michael Paz is a 59 y.o. male who presents to the ER complaining of abdominal pain that started around 11 AM prior to coming into the emergency room.  Patient states he ate a salad last night at worsening abdominal pain since lunch.  Pain is described as Dull, Constant, and does not radiate  Patient rates pain as a 7 on a ten scale.    Triage Vitals:    ED Triage Vitals [25 2156]   Temp Heart Rate Resp BP SpO2   97.5 °F (36.4 °C) 73 20 159/93 100 %      Temp src Heart Rate Source Patient Position BP Location FiO2 (%)   Oral Monitor Sitting Left arm --       Nurses Notes reviewed and agree, including vitals, allergies, social history and prior medical history.     Penicillins    Past Medical History:   Diagnosis Date    Acid reflux     Asthma     Depression     Diabetes mellitus     Heart murmur     Hyperlipidemia     Hypertension     Polyp of colon     Wears glasses        Past Surgical History:   Procedure Laterality Date    CHOLECYSTECTOMY N/A 2025    Procedure: CHOLECYSTECTOMY LAPAROSCOPIC WITH DAVINCI ROBOT;  Surgeon: Marcela Carpenter DO;  Location: [facility] OR;  Service: Robotics - DaVinci;  Laterality: N/A;    COLONOSCOPY      Dr Jef Hays [facility]    COLONOSCOPY N/A 3/19/2019    Procedure: COLONOSCOPY;  Surgeon: Pepe Alfaro MD;  Location: [facility] ENDOSCOPY;  Service: Gastroenterology     MULTIPLE TOOTH EXTRACTIONS         Social History     Socioeconomic History    Marital status:    Tobacco Use    Smoking status: Never    Smokeless tobacco: Former   Vaping Use    Vaping status: Never Used   Substance and Sexual Activity    Alcohol use: No    Drug use: Yes     Types: Marijuana    Sexual activity: Defer       Family History   Problem Relation Age of Onset    Heart disease Father     Hyperlipidemia Father     Hypertension Father     Kidney disease Brother     Stroke Maternal Grandmother        REVIEW OF SYSTEMS:     All systems reviewed and not pertinent unless noted.    Review of Systems   Gastrointestinal:  Positive for abdominal pain and nausea. Negative for constipation, diarrhea and vomiting.   All other systems reviewed and are negative.      Objective    Physical Exam  Vitals and nursing note reviewed.   Constitutional:       Appearance: Normal appearance.   HENT:      Head: Normocephalic and atraumatic.   Eyes:      Extraocular Movements: Extraocular movements intact.      Pupils: Pupils are equal, round, and reactive to light.   Cardiovascular:      Rate and Rhythm: Normal rate and regular rhythm.      Pulses: Normal pulses.      Heart sounds: Normal heart sounds.   Pulmonary:      Effort: Pulmonary effort is normal.      Breath sounds: Normal breath sounds.   Abdominal:      General: Bowel sounds are normal.      Palpations: Abdomen is soft.      Tenderness: There is abdominal tenderness in the right upper quadrant and epigastric area.   Musculoskeletal:         General: Normal range of motion.      Cervical back: Normal range of motion and neck supple.   Skin:     General: Skin is warm and dry.      Capillary Refill: Capillary refill takes less than 2 seconds.   Neurological:      Mental Status: He is alert.      GCS: GCS eye subscore is 4. GCS verbal subscore is 5. GCS motor subscore is 6.      Sensory: Sensation is intact.      Motor: Motor function is intact.   Psychiatric:          Attention and Perception: Attention and perception normal.         Mood and Affect: Mood and affect normal.         Speech: Speech normal.                           Procedures    ED Course:    No orders to display            Orders placed during this visit:    Orders Placed This Encounter   Procedures    CT Abdomen Pelvis With Contrast    US Gallbladder    XR Chest AP    [facility] Draw    Comprehensive Metabolic Panel    Lipase    Urinalysis With Microscopic If Indicated (No Culture) - Urine, Clean Catch    Lactic Acid, Plasma    CBC Auto Differential    Urinalysis, Microscopic Only - Urine, Clean Catch    Comprehensive Metabolic Panel    CBC Auto Differential    Ethanol    Comprehensive Metabolic Panel    CBC Auto Differential    Undress & Gown    Weigh Patient    Place Sequential Compression Device    Place Sequential Compression Device    Activity as Tolerated    Advance Diet As Tolerated -Target Diet: Per general surgery, advance to cardiac diet    Inpatient General Surgery Consult    Initiate Observation Status    Discharge patient    CBC & Differential    Green Top (Gel)    Lavender Top    Gold Top - SST    Light Blue Top    CBC & Differential       LAB Results:    Lab Results (last 24 hours)       ** No results found for the last 24 hours. **             If labs were ordered, I have independently reviewed the results and considered them in the diagnosis and treatment plan for the patient    RADIOLOGY    No radiology results from the last 24 hrs     If I have ordered, I have independently reviewed the above noted radiographic studies.  Please see the radiologist dictation for the official interpretation    Medications given to patient in the ER    Medications   lactated ringers infusion (0 mL/hr Intravenous Stopped 7/23/25 0320)   iopamidol (ISOVUE-300) 61 % injection 100 mL (100 mL Intravenous Given 7/22/25 0009)   morphine injection 4 mg (4 mg Intravenous Given 7/22/25 0137)   ondansetron (ZOFRAN) injection 4  mg (4 mg Intravenous Given 7/22/25 0137)   ceFAZolin 2000 mg IVPB in 100 mL NS (VTB) (2,000 mg Intravenous New Bag 7/22/25 1040)   indocyanine green (IC-GREEN) injection 2.5 mg (2.5 mg Intravenous Given 7/22/25 1040)       AS OF 11:09 EDT VITALS:    BP - 162/95  HR - 77  TEMP - 98.3 °F (36.8 °C) (Oral)  O2 SATS - 98%         MEDICAL DECISION MAKING, PROGRESS, and CONSULTS    All labs, if obtained, have been independently reviewed by me.  All radiology studies, if obtained, have been reviewed by me and the radiologist dictating the report.  All EKG's, if obtained, have been independently viewed and interpreted by me      Discussion below represents my analysis of pertinent findings related to patient's condition, differential diagnosis, treatment plan and final disposition.      Differential diagnosis:    Cholecystitis, appendicitis, mesenteric adenitis, cholelithiasis, nephrolithiasis, diverticulosis, diverticulosis,    Additional Sources:  None      Orders placed during this visit:  Orders Placed This Encounter   Procedures    CT Abdomen Pelvis With Contrast    US Gallbladder    XR Chest AP    [facility] Draw    Comprehensive Metabolic Panel    Lipase    Urinalysis With Microscopic If Indicated (No Culture) - Urine, Clean Catch    Lactic Acid, Plasma    CBC Auto Differential    Urinalysis, Microscopic Only - Urine, Clean Catch    Comprehensive Metabolic Panel    CBC Auto Differential    Ethanol    Comprehensive Metabolic Panel    CBC Auto Differential    Undress & Gown    Weigh Patient    Place Sequential Compression Device    Place Sequential Compression Device    Activity as Tolerated    Advance Diet As Tolerated -Target Diet: Per general surgery, advance to cardiac diet    Inpatient General Surgery Consult    Initiate Observation Status    Discharge patient    CBC & Differential    Green Top (Gel)    Lavender Top    Gold Top - SST    Light Blue Top    CBC & Differential         ED Course:    Consultants:   Hospitalist    Michael Paz is a 59 y.o. male who presents to the ER complaining of abdominal pain that started around 11 AM prior to coming into the emergency room.  Patient states he ate a salad last night at worsening abdominal pain since lunch.  Pain is described as Dull, Constant, and does not radiate  Patient rates pain as a 7 on a ten scale.    Physical exam of the patient showed mild to moderate tenderness to palpation in the right upper quadrant, epigastric area of the patient had labs obtained which did show mildly elevated white blood cell count, elevated liver enzymes, elevated bilirubin.  CT scan of abdomen did show possible cholecystitis due to late nature was unable to obtain ultrasound of the gallbladder to rule out obstruction due to elevated liver enzymes.  I have discussed this case with hospitalist who is agreed to admit the patient in observation status for ultrasound in the a.m.  Hospitalist has requested the patient be n.p.o. for possible surgical intervention in the a.m.    AS OF 11:09 EDT VITALS:    BP - 162/95  HR - 77  TEMP - 98.3 °F (36.8 °C) (Oral)  O2 SATS - 98%    I reviewed the patient's prescription monitoring report if available prior to discharge    DIAGNOSIS  Final diagnoses:   Calculus of gallbladder with acute cholecystitis without obstruction         DISPOSITION  ED Disposition       ED Disposition   Decision to Admit    Condition   --    Comment   Level of Care: Med/Surg [1]   Diagnosis: Cholelithiasis [915014]   Admitting Physician: SHARLA TORRES [[id number]]   Attending Physician: SHARLA TORRES [[id number]]                     Please note that portions of this document were completed with voice recognition software.        Bebo Rosa, JULY  07/24/25 1116

## 2025-07-25 NOTE — PROGRESS NOTES
"Patient: Michael Paz    YOB: 1965    Date: 07/29/2025    Primary Care Provider: Jenniffer Lima APRN    Chief Complaint   Patient presents with    Post-op     robotic-assisted laparoscopic cholecystectomy       History of present illness:  I saw the patient in the office today as a followup from their recent robotic-assisted laparoscopic cholecystectomy with indocyanine green (ICG) 7/22/25. Pathology was reviewed and indicated chronic cholecystitis. They state that they have done well post-operatively. He does report some diarrhea. He has been tolerating a relatively bland diet.     Vital Signs:   Vitals:    07/29/25 0927   BP: 140/82   Pulse: 85   Resp: 18   Temp: 97.8 °F (36.6 °C)   TempSrc: Temporal   SpO2: 97%   Weight: 79.1 kg (174 lb 6.4 oz)   Height: 170.2 cm (67\")     Physical Exam:     General Appearance:    Alert, cooperative, in no acute distress   Head:    Normocephalic, without obvious abnormality, atraumatic   Eyes:            Lids and lashes normal, conjunctivae and sclerae normal, no   icterus, no pallor, corneas clear, PERRLA   Throat:   No oral lesions, no thrush, oral mucosa moist   Lungs:     Clear to auscultation,respirations regular, even and                  unlabored    Heart:    Regular rhythm and normal rate, normal S1 and S2, no            murmur, no gallop, no rub, no click   Abdomen:    Soft, appropriately tender to palpation, incisions are well healing.   Extremities:   Moves all extremities well, no edema, no cyanosis, no             redness   Pulses:   Pulses palpable and equal bilaterally   Skin:   No bleeding, bruising or rash   Neurologic:   Cranial nerves 2 - 12 grossly intact, sensation intact, DTR       present and equal bilaterally       Assessment / Plan :    1. Status post laparoscopic cholecystectomy      Patient doing well post operatively from their recent robotic cholecystectomy. All relevant pathology was discussed in office today. The patient may " return to normal activity, but should continue with weight lifting restrictions for a total of 4-6 weeks after surgery. I do not need to see the patient back in office unless they have questions or concerns. If the patient has any, I would be happy to see them back in office at any time and they know to call and make an appointment. The patient expresses understanding and all of their questions were answered today.    Electronically signed by Marcela Carpenter DO  07/29/25

## 2025-07-29 ENCOUNTER — OFFICE VISIT (OUTPATIENT)
Dept: SURGERY | Facility: CLINIC | Age: 60
End: 2025-07-29
Payer: MEDICARE

## 2025-07-29 VITALS
RESPIRATION RATE: 18 BRPM | TEMPERATURE: 97.8 F | SYSTOLIC BLOOD PRESSURE: 140 MMHG | WEIGHT: 174.4 LBS | OXYGEN SATURATION: 97 % | BODY MASS INDEX: 27.37 KG/M2 | HEART RATE: 85 BPM | DIASTOLIC BLOOD PRESSURE: 82 MMHG | HEIGHT: 67 IN

## 2025-07-29 DIAGNOSIS — Z90.49 STATUS POST LAPAROSCOPIC CHOLECYSTECTOMY: Primary | ICD-10-CM

## 2025-07-29 RX ORDER — AMLODIPINE BESYLATE 5 MG/1
1 TABLET ORAL DAILY
COMMUNITY
Start: 2025-07-17

## 2025-08-21 ENCOUNTER — HOSPITAL ENCOUNTER (OUTPATIENT)
Dept: GENERAL RADIOLOGY | Facility: HOSPITAL | Age: 60
Discharge: HOME | End: 2025-08-21
Payer: MEDICARE

## 2025-08-21 ENCOUNTER — TRANSCRIBE ORDERS (OUTPATIENT)
Dept: LAB | Facility: HOSPITAL | Age: 60
End: 2025-08-21
Payer: MEDICARE

## 2025-08-21 ENCOUNTER — LAB (OUTPATIENT)
Dept: LAB | Facility: HOSPITAL | Age: 60
End: 2025-08-21
Payer: MEDICARE

## 2025-08-21 DIAGNOSIS — R53.83 TIREDNESS: Primary | ICD-10-CM

## 2025-08-21 DIAGNOSIS — R05.9 COUGH, UNSPECIFIED TYPE: ICD-10-CM

## 2025-08-21 PROCEDURE — 71046 X-RAY EXAM CHEST 2 VIEWS: CPT

## (undated) DEVICE — PATIENT RETURN ELECTRODE, SINGLE-USE, CONTACT QUALITY MONITORING, ADULT, WITH 9FT CORD, FOR PATIENTS WEIGING OVER 33LBS. (15KG): Brand: MEGADYNE

## (undated) DEVICE — ANCHOR TISSUE RETRIEVAL SYSTEM, BAG SIZE 125 ML, PORT SIZE 8 MM: Brand: ANCHOR TISSUE RETRIEVAL SYSTEM

## (undated) DEVICE — SEAL

## (undated) DEVICE — KT ORCA VLV SXN AIR/H2O W/SEAL 1P/U STRL

## (undated) DEVICE — PROGRASP FORCEPS: Brand: ENDOWRIST

## (undated) DEVICE — ENDOGATOR AUXILIARY WATER JET CONNECTOR: Brand: ENDOGATOR

## (undated) DEVICE — SUT VIC 0 UR6 27IN VCP603H

## (undated) DEVICE — HDRST POSTN SLOTTED A/

## (undated) DEVICE — GLV SURG SENSICARE POLYISPRN W/ALOE PF LF 6.5 GRN STRL

## (undated) DEVICE — SUCTION IRRIGATOR: Brand: ENDOWRIST

## (undated) DEVICE — GLV SURG BIOGEL PI ULTRATOUCH G SZ6.5 LF

## (undated) DEVICE — Device

## (undated) DEVICE — CADIERE FORCEPS: Brand: ENDOWRIST

## (undated) DEVICE — PERMANENT CAUTERY HOOK: Brand: ENDOWRIST

## (undated) DEVICE — ARM DRAPE

## (undated) DEVICE — CLNSR INST PREKLENZ SOAK/SHLD 6ML MD

## (undated) DEVICE — ST TBG PNEUMOCLEAR EVAC SMOKE HIFLO

## (undated) DEVICE — SYR LL TP 10ML STRL

## (undated) DEVICE — SYR SLPTP 30CC

## (undated) DEVICE — LARGE HEM-O-LOK CLIP APPLIER: Brand: ENDOWRIST

## (undated) DEVICE — HYPODERMIC SAFETY NEEDLE: Brand: MONOJECT

## (undated) DEVICE — MICRO HVTSA, 0.5G AND HVTSA SOURCEMARK PRODUCT CODE M1206 AND M1206-01: Brand: EXOFIN MICRO HVTSA, 0.5G

## (undated) DEVICE — COLUMN DRAPE

## (undated) DEVICE — PK LAP GEN 70

## (undated) DEVICE — SUT VIC 0/0 UR6 27IN DYED J603H

## (undated) DEVICE — ANTIBACTERIAL UNDYED BRAIDED (POLYGLACTIN 910), SYNTHETIC ABSORBABLE SUTURE: Brand: COATED VICRYL

## (undated) DEVICE — BLADELESS OBTURATOR: Brand: WECK VISTA